# Patient Record
Sex: FEMALE | Race: WHITE | NOT HISPANIC OR LATINO | URBAN - METROPOLITAN AREA
[De-identification: names, ages, dates, MRNs, and addresses within clinical notes are randomized per-mention and may not be internally consistent; named-entity substitution may affect disease eponyms.]

---

## 2022-01-25 DIAGNOSIS — R42 DIZZY: Primary | ICD-10-CM

## 2022-01-27 LAB — EKG IMPRESSION: NORMAL

## 2023-01-01 ENCOUNTER — ANESTHESIA (OUTPATIENT)
Dept: SURGERY | Facility: MEDICAL CENTER | Age: 88
DRG: 469 | End: 2023-01-01
Payer: MEDICARE

## 2023-01-01 ENCOUNTER — APPOINTMENT (OUTPATIENT)
Dept: RADIOLOGY | Facility: MEDICAL CENTER | Age: 88
DRG: 480 | End: 2023-01-01
Attending: STUDENT IN AN ORGANIZED HEALTH CARE EDUCATION/TRAINING PROGRAM
Payer: MEDICARE

## 2023-01-01 ENCOUNTER — HOSPITAL ENCOUNTER (OUTPATIENT)
Dept: RADIOLOGY | Facility: MEDICAL CENTER | Age: 88
End: 2023-10-21

## 2023-01-01 ENCOUNTER — APPOINTMENT (OUTPATIENT)
Dept: RADIOLOGY | Facility: MEDICAL CENTER | Age: 88
DRG: 469 | End: 2023-01-01
Attending: STUDENT IN AN ORGANIZED HEALTH CARE EDUCATION/TRAINING PROGRAM
Payer: MEDICARE

## 2023-01-01 ENCOUNTER — ANESTHESIA EVENT (OUTPATIENT)
Dept: SURGERY | Facility: MEDICAL CENTER | Age: 88
DRG: 469 | End: 2023-01-01
Payer: MEDICARE

## 2023-01-01 ENCOUNTER — ANESTHESIA EVENT (OUTPATIENT)
Dept: SURGERY | Facility: MEDICAL CENTER | Age: 88
DRG: 480 | End: 2023-01-01
Payer: MEDICARE

## 2023-01-01 ENCOUNTER — APPOINTMENT (OUTPATIENT)
Dept: RADIOLOGY | Facility: MEDICAL CENTER | Age: 88
DRG: 469 | End: 2023-01-01
Payer: MEDICARE

## 2023-01-01 ENCOUNTER — APPOINTMENT (OUTPATIENT)
Dept: RADIOLOGY | Facility: MEDICAL CENTER | Age: 88
DRG: 480 | End: 2023-01-01
Attending: HOSPITALIST
Payer: MEDICARE

## 2023-01-01 ENCOUNTER — HOSPITAL ENCOUNTER (INPATIENT)
Facility: MEDICAL CENTER | Age: 88
LOS: 3 days | DRG: 480 | End: 2023-10-24
Attending: EMERGENCY MEDICINE | Admitting: STUDENT IN AN ORGANIZED HEALTH CARE EDUCATION/TRAINING PROGRAM
Payer: MEDICARE

## 2023-01-01 ENCOUNTER — APPOINTMENT (OUTPATIENT)
Dept: CARDIOLOGY | Facility: MEDICAL CENTER | Age: 88
DRG: 480 | End: 2023-01-01
Attending: STUDENT IN AN ORGANIZED HEALTH CARE EDUCATION/TRAINING PROGRAM
Payer: MEDICARE

## 2023-01-01 ENCOUNTER — APPOINTMENT (OUTPATIENT)
Dept: RADIOLOGY | Facility: MEDICAL CENTER | Age: 88
DRG: 480 | End: 2023-01-01
Attending: EMERGENCY MEDICINE
Payer: MEDICARE

## 2023-01-01 ENCOUNTER — HOSPITAL ENCOUNTER (INPATIENT)
Facility: MEDICAL CENTER | Age: 88
LOS: 3 days | DRG: 469 | End: 2023-11-09
Attending: STUDENT IN AN ORGANIZED HEALTH CARE EDUCATION/TRAINING PROGRAM | Admitting: STUDENT IN AN ORGANIZED HEALTH CARE EDUCATION/TRAINING PROGRAM
Payer: MEDICARE

## 2023-01-01 ENCOUNTER — ANESTHESIA (OUTPATIENT)
Dept: SURGERY | Facility: MEDICAL CENTER | Age: 88
DRG: 480 | End: 2023-01-01
Payer: MEDICARE

## 2023-01-01 VITALS
BODY MASS INDEX: 25.72 KG/M2 | DIASTOLIC BLOOD PRESSURE: 46 MMHG | HEART RATE: 103 BPM | TEMPERATURE: 98.4 F | WEIGHT: 139.77 LBS | RESPIRATION RATE: 18 BRPM | OXYGEN SATURATION: 95 % | SYSTOLIC BLOOD PRESSURE: 98 MMHG | HEIGHT: 62 IN

## 2023-01-01 VITALS
HEIGHT: 61 IN | WEIGHT: 146.61 LBS | HEART RATE: 89 BPM | RESPIRATION RATE: 15 BRPM | DIASTOLIC BLOOD PRESSURE: 53 MMHG | SYSTOLIC BLOOD PRESSURE: 115 MMHG | BODY MASS INDEX: 27.68 KG/M2 | TEMPERATURE: 98.6 F | OXYGEN SATURATION: 95 %

## 2023-01-01 DIAGNOSIS — M25.551 RIGHT HIP PAIN: ICD-10-CM

## 2023-01-01 DIAGNOSIS — S22.41XA CLOSED FRACTURE OF MULTIPLE RIBS OF RIGHT SIDE, INITIAL ENCOUNTER: ICD-10-CM

## 2023-01-01 DIAGNOSIS — S72.001A CLOSED FRACTURE OF RIGHT HIP, INITIAL ENCOUNTER (HCC): ICD-10-CM

## 2023-01-01 DIAGNOSIS — D64.9 ANEMIA, UNSPECIFIED TYPE: ICD-10-CM

## 2023-01-01 DIAGNOSIS — S72.144A CLOSED NONDISPLACED INTERTROCHANTERIC FRACTURE OF RIGHT FEMUR, INITIAL ENCOUNTER (HCC): ICD-10-CM

## 2023-01-01 LAB
ABO + RH BLD: NORMAL
ABO GROUP BLD: NORMAL
ABO GROUP BLD: NORMAL
ALBUMIN SERPL BCP-MCNC: 1.9 G/DL (ref 3.2–4.9)
ALBUMIN SERPL BCP-MCNC: 2.5 G/DL (ref 3.2–4.9)
ALBUMIN SERPL BCP-MCNC: 2.7 G/DL (ref 3.2–4.9)
ALBUMIN SERPL BCP-MCNC: 3.2 G/DL (ref 3.2–4.9)
ALBUMIN/GLOB SERPL: 0.9 G/DL
ALBUMIN/GLOB SERPL: 1.2 G/DL
ALP SERPL-CCNC: 100 U/L (ref 30–99)
ALP SERPL-CCNC: 162 U/L (ref 30–99)
ALT SERPL-CCNC: 12 U/L (ref 2–50)
ALT SERPL-CCNC: 19 U/L (ref 2–50)
ANION GAP SERPL CALC-SCNC: 10 MMOL/L (ref 7–16)
ANION GAP SERPL CALC-SCNC: 11 MMOL/L (ref 7–16)
ANION GAP SERPL CALC-SCNC: 13 MMOL/L (ref 7–16)
ANION GAP SERPL CALC-SCNC: 8 MMOL/L (ref 7–16)
ANION GAP SERPL CALC-SCNC: 9 MMOL/L (ref 7–16)
ANISOCYTOSIS BLD QL SMEAR: ABNORMAL
ANISOCYTOSIS BLD QL SMEAR: ABNORMAL
APPEARANCE UR: CLEAR
APTT PPP: 24.2 SEC (ref 24.7–36)
AST SERPL-CCNC: 24 U/L (ref 12–45)
AST SERPL-CCNC: 38 U/L (ref 12–45)
BACTERIA #/AREA URNS HPF: NEGATIVE /HPF
BACTERIA BLD CULT: NORMAL
BACTERIA BLD CULT: NORMAL
BACTERIA UR CULT: NORMAL
BARCODED ABORH UBTYP: 5100
BARCODED ABORH UBTYP: 5100
BARCODED ABORH UBTYP: 9500
BARCODED ABORH UBTYP: 9500
BARCODED PRD CODE UBPRD: NORMAL
BARCODED UNIT NUM UBUNT: NORMAL
BASOPHILS # BLD AUTO: 0 % (ref 0–1.8)
BASOPHILS # BLD AUTO: 0.1 % (ref 0–1.8)
BASOPHILS # BLD AUTO: 0.2 % (ref 0–1.8)
BASOPHILS # BLD AUTO: 0.2 % (ref 0–1.8)
BASOPHILS # BLD: 0 K/UL (ref 0–0.12)
BASOPHILS # BLD: 0.01 K/UL (ref 0–0.12)
BASOPHILS # BLD: 0.02 K/UL (ref 0–0.12)
BASOPHILS # BLD: 0.03 K/UL (ref 0–0.12)
BILIRUB SERPL-MCNC: 0.5 MG/DL (ref 0.1–1.5)
BILIRUB SERPL-MCNC: 0.8 MG/DL (ref 0.1–1.5)
BILIRUB UR QL STRIP.AUTO: NEGATIVE
BLD GP AB SCN SERPL QL: NORMAL
BLD GP AB SCN SERPL QL: NORMAL
BUN SERPL-MCNC: 14 MG/DL (ref 8–22)
BUN SERPL-MCNC: 19 MG/DL (ref 8–22)
BUN SERPL-MCNC: 22 MG/DL (ref 8–22)
BUN SERPL-MCNC: 26 MG/DL (ref 8–22)
BUN SERPL-MCNC: 31 MG/DL (ref 8–22)
BUN SERPL-MCNC: 31 MG/DL (ref 8–22)
BUN SERPL-MCNC: 35 MG/DL (ref 8–22)
CALCIUM ALBUM COR SERPL-MCNC: 8.5 MG/DL (ref 8.5–10.5)
CALCIUM ALBUM COR SERPL-MCNC: 8.8 MG/DL (ref 8.5–10.5)
CALCIUM ALBUM COR SERPL-MCNC: 8.8 MG/DL (ref 8.5–10.5)
CALCIUM ALBUM COR SERPL-MCNC: 8.9 MG/DL (ref 8.5–10.5)
CALCIUM SERPL-MCNC: 7.2 MG/DL (ref 8.5–10.5)
CALCIUM SERPL-MCNC: 7.3 MG/DL (ref 8.5–10.5)
CALCIUM SERPL-MCNC: 7.3 MG/DL (ref 8.5–10.5)
CALCIUM SERPL-MCNC: 7.5 MG/DL (ref 8.5–10.5)
CALCIUM SERPL-MCNC: 7.6 MG/DL (ref 8.5–10.5)
CALCIUM SERPL-MCNC: 7.8 MG/DL (ref 8.5–10.5)
CALCIUM SERPL-MCNC: 8.2 MG/DL (ref 8.5–10.5)
CFT BLD TEG: 4.5 MIN (ref 4.6–9.1)
CFT P HPASE BLD TEG: 4.5 MIN (ref 4.3–8.3)
CHLORIDE SERPL-SCNC: 100 MMOL/L (ref 96–112)
CHLORIDE SERPL-SCNC: 100 MMOL/L (ref 96–112)
CHLORIDE SERPL-SCNC: 103 MMOL/L (ref 96–112)
CHLORIDE SERPL-SCNC: 104 MMOL/L (ref 96–112)
CHLORIDE SERPL-SCNC: 107 MMOL/L (ref 96–112)
CLOT ANGLE BLD TEG: 76.1 DEGREES (ref 63–78)
CLOT LYSIS 30M P MA LENFR BLD TEG: 1.1 % (ref 0–2.6)
CO2 SERPL-SCNC: 20 MMOL/L (ref 20–33)
CO2 SERPL-SCNC: 21 MMOL/L (ref 20–33)
CO2 SERPL-SCNC: 21 MMOL/L (ref 20–33)
CO2 SERPL-SCNC: 23 MMOL/L (ref 20–33)
CO2 SERPL-SCNC: 24 MMOL/L (ref 20–33)
CO2 SERPL-SCNC: 24 MMOL/L (ref 20–33)
CO2 SERPL-SCNC: 26 MMOL/L (ref 20–33)
COLOR UR: ABNORMAL
COMMENT 1642: NORMAL
COMPONENT R 8504R: NORMAL
CREAT SERPL-MCNC: 1.23 MG/DL (ref 0.5–1.4)
CREAT SERPL-MCNC: 1.55 MG/DL (ref 0.5–1.4)
CREAT SERPL-MCNC: 1.56 MG/DL (ref 0.5–1.4)
CREAT SERPL-MCNC: 1.57 MG/DL (ref 0.5–1.4)
CREAT SERPL-MCNC: 1.65 MG/DL (ref 0.5–1.4)
CREAT SERPL-MCNC: 1.83 MG/DL (ref 0.5–1.4)
CREAT SERPL-MCNC: 1.84 MG/DL (ref 0.5–1.4)
CT.EXTRINSIC BLD ROTEM: 0.9 MIN (ref 0.8–2.1)
EKG IMPRESSION: NORMAL
EOSINOPHIL # BLD AUTO: 0 K/UL (ref 0–0.51)
EOSINOPHIL # BLD AUTO: 0 K/UL (ref 0–0.51)
EOSINOPHIL # BLD AUTO: 0.07 K/UL (ref 0–0.51)
EOSINOPHIL # BLD AUTO: 0.25 K/UL (ref 0–0.51)
EOSINOPHIL NFR BLD: 0 % (ref 0–6.9)
EOSINOPHIL NFR BLD: 0 % (ref 0–6.9)
EOSINOPHIL NFR BLD: 0.6 % (ref 0–6.9)
EOSINOPHIL NFR BLD: 3.1 % (ref 0–6.9)
EPI CELLS #/AREA URNS HPF: ABNORMAL /HPF
ERYTHROCYTE [DISTWIDTH] IN BLOOD BY AUTOMATED COUNT: 52.7 FL (ref 35.9–50)
ERYTHROCYTE [DISTWIDTH] IN BLOOD BY AUTOMATED COUNT: 53 FL (ref 35.9–50)
ERYTHROCYTE [DISTWIDTH] IN BLOOD BY AUTOMATED COUNT: 54.8 FL (ref 35.9–50)
ERYTHROCYTE [DISTWIDTH] IN BLOOD BY AUTOMATED COUNT: 57.7 FL (ref 35.9–50)
ERYTHROCYTE [DISTWIDTH] IN BLOOD BY AUTOMATED COUNT: 63.7 FL (ref 35.9–50)
ERYTHROCYTE [DISTWIDTH] IN BLOOD BY AUTOMATED COUNT: 73.4 FL (ref 35.9–50)
FERRITIN SERPL-MCNC: 362 NG/ML (ref 10–291)
GFR SERPLBLD CREATININE-BSD FMLA CKD-EPI: 25 ML/MIN/1.73 M 2
GFR SERPLBLD CREATININE-BSD FMLA CKD-EPI: 26 ML/MIN/1.73 M 2
GFR SERPLBLD CREATININE-BSD FMLA CKD-EPI: 29 ML/MIN/1.73 M 2
GFR SERPLBLD CREATININE-BSD FMLA CKD-EPI: 31 ML/MIN/1.73 M 2
GFR SERPLBLD CREATININE-BSD FMLA CKD-EPI: 41 ML/MIN/1.73 M 2
GLOBULIN SER CALC-MCNC: 2.7 G/DL (ref 1.9–3.5)
GLOBULIN SER CALC-MCNC: 2.9 G/DL (ref 1.9–3.5)
GLUCOSE SERPL-MCNC: 112 MG/DL (ref 65–99)
GLUCOSE SERPL-MCNC: 119 MG/DL (ref 65–99)
GLUCOSE SERPL-MCNC: 123 MG/DL (ref 65–99)
GLUCOSE SERPL-MCNC: 143 MG/DL (ref 65–99)
GLUCOSE SERPL-MCNC: 94 MG/DL (ref 65–99)
GLUCOSE SERPL-MCNC: 96 MG/DL (ref 65–99)
GLUCOSE SERPL-MCNC: 98 MG/DL (ref 65–99)
GLUCOSE UR STRIP.AUTO-MCNC: NEGATIVE MG/DL
HCT VFR BLD AUTO: 20.1 % (ref 37–47)
HCT VFR BLD AUTO: 20.8 % (ref 37–47)
HCT VFR BLD AUTO: 22.4 % (ref 37–47)
HCT VFR BLD AUTO: 22.4 % (ref 37–47)
HCT VFR BLD AUTO: 22.6 % (ref 37–47)
HCT VFR BLD AUTO: 24.6 % (ref 37–47)
HCT VFR BLD AUTO: 24.7 % (ref 37–47)
HCT VFR BLD AUTO: 24.9 % (ref 37–47)
HCT VFR BLD AUTO: 24.9 % (ref 37–47)
HCT VFR BLD AUTO: 25.1 % (ref 37–47)
HCT VFR BLD AUTO: 25.7 % (ref 37–47)
HCT VFR BLD AUTO: 27.3 % (ref 37–47)
HCT VFR BLD AUTO: 28.4 % (ref 37–47)
HCT VFR BLD AUTO: 32.4 % (ref 37–47)
HGB BLD-MCNC: 10.5 G/DL (ref 12–16)
HGB BLD-MCNC: 6.5 G/DL (ref 12–16)
HGB BLD-MCNC: 6.8 G/DL (ref 12–16)
HGB BLD-MCNC: 7.1 G/DL (ref 12–16)
HGB BLD-MCNC: 7.5 G/DL (ref 12–16)
HGB BLD-MCNC: 7.5 G/DL (ref 12–16)
HGB BLD-MCNC: 7.6 G/DL (ref 12–16)
HGB BLD-MCNC: 7.8 G/DL (ref 12–16)
HGB BLD-MCNC: 8 G/DL (ref 12–16)
HGB BLD-MCNC: 8 G/DL (ref 12–16)
HGB BLD-MCNC: 8.1 G/DL (ref 12–16)
HGB BLD-MCNC: 8.2 G/DL (ref 12–16)
HGB BLD-MCNC: 8.6 G/DL (ref 12–16)
HGB BLD-MCNC: 9.1 G/DL (ref 12–16)
HGB BLD-MCNC: 9.1 G/DL (ref 12–16)
HYALINE CASTS #/AREA URNS LPF: ABNORMAL /LPF
IMM GRANULOCYTES # BLD AUTO: 0.05 K/UL (ref 0–0.11)
IMM GRANULOCYTES # BLD AUTO: 0.12 K/UL (ref 0–0.11)
IMM GRANULOCYTES # BLD AUTO: 0.2 K/UL (ref 0–0.11)
IMM GRANULOCYTES NFR BLD AUTO: 0.6 % (ref 0–0.9)
IMM GRANULOCYTES NFR BLD AUTO: 0.7 % (ref 0–0.9)
IMM GRANULOCYTES NFR BLD AUTO: 1.7 % (ref 0–0.9)
INR PPP: 1.12 (ref 0.87–1.13)
INR PPP: 1.15 (ref 0.87–1.13)
IRON SATN MFR SERPL: 12 % (ref 15–55)
IRON SERPL-MCNC: 26 UG/DL (ref 40–170)
KETONES UR STRIP.AUTO-MCNC: ABNORMAL MG/DL
LACTATE SERPL-SCNC: 2.9 MMOL/L (ref 0.5–2)
LACTATE SERPL-SCNC: 3.1 MMOL/L (ref 0.5–2)
LACTATE SERPL-SCNC: 4.4 MMOL/L (ref 0.5–2)
LEUKOCYTE ESTERASE UR QL STRIP.AUTO: NEGATIVE
LV EJECT FRACT  99904: 55
LV EJECT FRACT MOD 2C 99903: 40.46
LV EJECT FRACT MOD 4C 99902: 48.7
LV EJECT FRACT MOD BP 99901: 47.5
LYMPHOCYTES # BLD AUTO: 0.54 K/UL (ref 1–4.8)
LYMPHOCYTES # BLD AUTO: 0.71 K/UL (ref 1–4.8)
LYMPHOCYTES # BLD AUTO: 0.75 K/UL (ref 1–4.8)
LYMPHOCYTES # BLD AUTO: 0.95 K/UL (ref 1–4.8)
LYMPHOCYTES NFR BLD: 3.3 % (ref 22–41)
LYMPHOCYTES NFR BLD: 6 % (ref 22–41)
LYMPHOCYTES NFR BLD: 8.1 % (ref 22–41)
LYMPHOCYTES NFR BLD: 8.8 % (ref 22–41)
MACROCYTES BLD QL SMEAR: ABNORMAL
MAGNESIUM SERPL-MCNC: 1.8 MG/DL (ref 1.5–2.5)
MAGNESIUM SERPL-MCNC: 2.1 MG/DL (ref 1.5–2.5)
MANUAL DIFF BLD: NORMAL
MCF BLD TEG: 67.4 MM (ref 52–69)
MCF.PLATELET INHIB BLD ROTEM: 27.9 MM (ref 15–32)
MCH RBC QN AUTO: 30.3 PG (ref 27–33)
MCH RBC QN AUTO: 30.6 PG (ref 27–33)
MCH RBC QN AUTO: 31.6 PG (ref 27–33)
MCH RBC QN AUTO: 31.6 PG (ref 27–33)
MCH RBC QN AUTO: 31.7 PG (ref 27–33)
MCH RBC QN AUTO: 33.5 PG (ref 27–33)
MCHC RBC AUTO-ENTMCNC: 31.1 G/DL (ref 32.2–35.5)
MCHC RBC AUTO-ENTMCNC: 31.5 G/DL (ref 32.2–35.5)
MCHC RBC AUTO-ENTMCNC: 32.1 G/DL (ref 32.2–35.5)
MCHC RBC AUTO-ENTMCNC: 32.3 G/DL (ref 32.2–35.5)
MCHC RBC AUTO-ENTMCNC: 32.4 G/DL (ref 32.2–35.5)
MCHC RBC AUTO-ENTMCNC: 33.5 G/DL (ref 32.2–35.5)
MCV RBC AUTO: 107.7 FL (ref 81.4–97.8)
MCV RBC AUTO: 94.3 FL (ref 81.4–97.8)
MCV RBC AUTO: 94.5 FL (ref 81.4–97.8)
MCV RBC AUTO: 97.2 FL (ref 81.4–97.8)
MCV RBC AUTO: 97.6 FL (ref 81.4–97.8)
MCV RBC AUTO: 98 FL (ref 81.4–97.8)
MICRO URNS: ABNORMAL
MICROCYTES BLD QL SMEAR: ABNORMAL
MONOCYTES # BLD AUTO: 0.54 K/UL (ref 0–0.85)
MONOCYTES # BLD AUTO: 0.65 K/UL (ref 0–0.85)
MONOCYTES # BLD AUTO: 0.67 K/UL (ref 0–0.85)
MONOCYTES # BLD AUTO: 0.95 K/UL (ref 0–0.85)
MONOCYTES NFR BLD AUTO: 4.3 % (ref 0–13.4)
MONOCYTES NFR BLD AUTO: 5.7 % (ref 0–13.4)
MONOCYTES NFR BLD AUTO: 5.9 % (ref 0–13.4)
MONOCYTES NFR BLD AUTO: 8.1 % (ref 0–13.4)
MORPHOLOGY BLD-IMP: NORMAL
MORPHOLOGY BLD-IMP: NORMAL
NEUTROPHILS # BLD AUTO: 11.21 K/UL (ref 1.82–7.42)
NEUTROPHILS # BLD AUTO: 14.54 K/UL (ref 1.82–7.42)
NEUTROPHILS # BLD AUTO: 6.37 K/UL (ref 1.82–7.42)
NEUTROPHILS # BLD AUTO: 9.9 K/UL (ref 1.82–7.42)
NEUTROPHILS NFR BLD: 79.2 % (ref 44–72)
NEUTROPHILS NFR BLD: 83.8 % (ref 44–72)
NEUTROPHILS NFR BLD: 89.7 % (ref 44–72)
NEUTROPHILS NFR BLD: 89.9 % (ref 44–72)
NITRITE UR QL STRIP.AUTO: NEGATIVE
NRBC # BLD AUTO: 0 K/UL
NRBC # BLD AUTO: 0.02 K/UL
NRBC BLD-RTO: 0 /100 WBC (ref 0–0.2)
NRBC BLD-RTO: 0.2 /100 WBC (ref 0–0.2)
PA AA BLD-ACNC: 67.9 % (ref 0–11)
PA ADP BLD-ACNC: 35.8 % (ref 0–17)
PH UR STRIP.AUTO: 5.5 [PH] (ref 5–8)
PHOSPHATE SERPL-MCNC: 3.9 MG/DL (ref 2.5–4.5)
PHOSPHATE SERPL-MCNC: 4.2 MG/DL (ref 2.5–4.5)
PLATELET # BLD AUTO: 151 K/UL (ref 164–446)
PLATELET # BLD AUTO: 181 K/UL (ref 164–446)
PLATELET # BLD AUTO: 210 K/UL (ref 164–446)
PLATELET # BLD AUTO: 282 K/UL (ref 164–446)
PLATELET # BLD AUTO: 288 K/UL (ref 164–446)
PLATELET # BLD AUTO: 440 K/UL (ref 164–446)
PLATELET BLD QL SMEAR: NORMAL
PLATELET BLD QL SMEAR: NORMAL
PMV BLD AUTO: 10.2 FL (ref 9–12.9)
PMV BLD AUTO: 10.6 FL (ref 9–12.9)
PMV BLD AUTO: 9.2 FL (ref 9–12.9)
PMV BLD AUTO: 9.5 FL (ref 9–12.9)
PMV BLD AUTO: 9.8 FL (ref 9–12.9)
PMV BLD AUTO: 9.9 FL (ref 9–12.9)
POTASSIUM SERPL-SCNC: 4.5 MMOL/L (ref 3.6–5.5)
POTASSIUM SERPL-SCNC: 4.5 MMOL/L (ref 3.6–5.5)
POTASSIUM SERPL-SCNC: 4.8 MMOL/L (ref 3.6–5.5)
POTASSIUM SERPL-SCNC: 5 MMOL/L (ref 3.6–5.5)
POTASSIUM SERPL-SCNC: 5 MMOL/L (ref 3.6–5.5)
POTASSIUM SERPL-SCNC: 5.1 MMOL/L (ref 3.6–5.5)
POTASSIUM SERPL-SCNC: 5.1 MMOL/L (ref 3.6–5.5)
POTASSIUM UR-SCNC: >100 MMOL/L
PROCALCITONIN SERPL-MCNC: 0.33 NG/ML
PRODUCT TYPE UPROD: NORMAL
PROT SERPL-MCNC: 5.6 G/DL (ref 6–8.2)
PROT SERPL-MCNC: 5.9 G/DL (ref 6–8.2)
PROT UR QL STRIP: 30 MG/DL
PROT UR-MCNC: 61 MG/DL (ref 0–15)
PROTHROMBIN TIME: 14.5 SEC (ref 12–14.6)
PROTHROMBIN TIME: 14.8 SEC (ref 12–14.6)
RBC # BLD AUTO: 2.05 M/UL (ref 4.2–5.4)
RBC # BLD AUTO: 2.33 M/UL (ref 4.2–5.4)
RBC # BLD AUTO: 2.37 M/UL (ref 4.2–5.4)
RBC # BLD AUTO: 2.64 M/UL (ref 4.2–5.4)
RBC # BLD AUTO: 2.81 M/UL (ref 4.2–5.4)
RBC # BLD AUTO: 3.32 M/UL (ref 4.2–5.4)
RBC # URNS HPF: ABNORMAL /HPF
RBC BLD AUTO: PRESENT
RBC BLD AUTO: PRESENT
RBC UR QL AUTO: NEGATIVE
RH BLD: NORMAL
RH BLD: NORMAL
SIGNIFICANT IND 70042: NORMAL
SITE SITE: NORMAL
SODIUM SERPL-SCNC: 133 MMOL/L (ref 135–145)
SODIUM SERPL-SCNC: 134 MMOL/L (ref 135–145)
SODIUM SERPL-SCNC: 134 MMOL/L (ref 135–145)
SODIUM SERPL-SCNC: 136 MMOL/L (ref 135–145)
SODIUM SERPL-SCNC: 137 MMOL/L (ref 135–145)
SODIUM SERPL-SCNC: 138 MMOL/L (ref 135–145)
SODIUM SERPL-SCNC: 139 MMOL/L (ref 135–145)
SODIUM UR-SCNC: 47 MMOL/L
SOURCE SOURCE: NORMAL
SP GR UR STRIP.AUTO: 1.04
STOMATOCYTES BLD QL SMEAR: NORMAL
TEG ALGORITHM TGALG: ABNORMAL
TIBC SERPL-MCNC: 218 UG/DL (ref 250–450)
TROPONIN T SERPL-MCNC: 62 NG/L (ref 6–19)
TROPONIN T SERPL-MCNC: 64 NG/L (ref 6–19)
TROPONIN T SERPL-MCNC: 68 NG/L (ref 6–19)
UIBC SERPL-MCNC: 192 UG/DL (ref 110–370)
UNIT STATUS USTAT: NORMAL
UROBILINOGEN UR STRIP.AUTO-MCNC: 0.2 MG/DL
WBC # BLD AUTO: 11.8 K/UL (ref 4.8–10.8)
WBC # BLD AUTO: 11.8 K/UL (ref 4.8–10.8)
WBC # BLD AUTO: 12.5 K/UL (ref 4.8–10.8)
WBC # BLD AUTO: 16.2 K/UL (ref 4.8–10.8)
WBC # BLD AUTO: 8.1 K/UL (ref 4.8–10.8)
WBC # BLD AUTO: 9.5 K/UL (ref 4.8–10.8)
WBC #/AREA URNS HPF: ABNORMAL /HPF

## 2023-01-01 PROCEDURE — 93005 ELECTROCARDIOGRAM TRACING: CPT | Performed by: HOSPITALIST

## 2023-01-01 PROCEDURE — 86923 COMPATIBILITY TEST ELECTRIC: CPT

## 2023-01-01 PROCEDURE — 85027 COMPLETE CBC AUTOMATED: CPT

## 2023-01-01 PROCEDURE — C1776 JOINT DEVICE (IMPLANTABLE): HCPCS | Performed by: STUDENT IN AN ORGANIZED HEALTH CARE EDUCATION/TRAINING PROGRAM

## 2023-01-01 PROCEDURE — 770020 HCHG ROOM/CARE - TELE (206)

## 2023-01-01 PROCEDURE — 73551 X-RAY EXAM OF FEMUR 1: CPT | Mod: RT

## 2023-01-01 PROCEDURE — C1713 ANCHOR/SCREW BN/BN,TIS/BN: HCPCS | Performed by: STUDENT IN AN ORGANIZED HEALTH CARE EDUCATION/TRAINING PROGRAM

## 2023-01-01 PROCEDURE — 85610 PROTHROMBIN TIME: CPT

## 2023-01-01 PROCEDURE — 80069 RENAL FUNCTION PANEL: CPT

## 2023-01-01 PROCEDURE — A9270 NON-COVERED ITEM OR SERVICE: HCPCS | Performed by: STUDENT IN AN ORGANIZED HEALTH CARE EDUCATION/TRAINING PROGRAM

## 2023-01-01 PROCEDURE — 84156 ASSAY OF PROTEIN URINE: CPT

## 2023-01-01 PROCEDURE — 700105 HCHG RX REV CODE 258

## 2023-01-01 PROCEDURE — 700105 HCHG RX REV CODE 258: Performed by: STUDENT IN AN ORGANIZED HEALTH CARE EDUCATION/TRAINING PROGRAM

## 2023-01-01 PROCEDURE — A9270 NON-COVERED ITEM OR SERVICE: HCPCS | Performed by: INTERNAL MEDICINE

## 2023-01-01 PROCEDURE — 0QS606Z REPOSITION RIGHT UPPER FEMUR WITH INTRAMEDULLARY INTERNAL FIXATION DEVICE, OPEN APPROACH: ICD-10-PCS | Performed by: STUDENT IN AN ORGANIZED HEALTH CARE EDUCATION/TRAINING PROGRAM

## 2023-01-01 PROCEDURE — 99285 EMERGENCY DEPT VISIT HI MDM: CPT

## 2023-01-01 PROCEDURE — 160029 HCHG SURGERY MINUTES - 1ST 30 MINS LEVEL 4: Performed by: STUDENT IN AN ORGANIZED HEALTH CARE EDUCATION/TRAINING PROGRAM

## 2023-01-01 PROCEDURE — 71045 X-RAY EXAM CHEST 1 VIEW: CPT

## 2023-01-01 PROCEDURE — 700102 HCHG RX REV CODE 250 W/ 637 OVERRIDE(OP): Performed by: STUDENT IN AN ORGANIZED HEALTH CARE EDUCATION/TRAINING PROGRAM

## 2023-01-01 PROCEDURE — 99239 HOSP IP/OBS DSCHRG MGMT >30: CPT | Performed by: INTERNAL MEDICINE

## 2023-01-01 PROCEDURE — 84133 ASSAY OF URINE POTASSIUM: CPT

## 2023-01-01 PROCEDURE — 84145 PROCALCITONIN (PCT): CPT

## 2023-01-01 PROCEDURE — 51798 US URINE CAPACITY MEASURE: CPT

## 2023-01-01 PROCEDURE — 85018 HEMOGLOBIN: CPT | Mod: 91

## 2023-01-01 PROCEDURE — 160002 HCHG RECOVERY MINUTES (STAT): Performed by: STUDENT IN AN ORGANIZED HEALTH CARE EDUCATION/TRAINING PROGRAM

## 2023-01-01 PROCEDURE — 86850 RBC ANTIBODY SCREEN: CPT

## 2023-01-01 PROCEDURE — 85347 COAGULATION TIME ACTIVATED: CPT

## 2023-01-01 PROCEDURE — 86900 BLOOD TYPING SEROLOGIC ABO: CPT

## 2023-01-01 PROCEDURE — 74230 X-RAY XM SWLNG FUNCJ C+: CPT

## 2023-01-01 PROCEDURE — 99233 SBSQ HOSP IP/OBS HIGH 50: CPT | Performed by: INTERNAL MEDICINE

## 2023-01-01 PROCEDURE — 84484 ASSAY OF TROPONIN QUANT: CPT

## 2023-01-01 PROCEDURE — 80053 COMPREHEN METABOLIC PANEL: CPT

## 2023-01-01 PROCEDURE — 92526 ORAL FUNCTION THERAPY: CPT

## 2023-01-01 PROCEDURE — 160048 HCHG OR STATISTICAL LEVEL 1-5: Performed by: STUDENT IN AN ORGANIZED HEALTH CARE EDUCATION/TRAINING PROGRAM

## 2023-01-01 PROCEDURE — 700102 HCHG RX REV CODE 250 W/ 637 OVERRIDE(OP): Performed by: INTERNAL MEDICINE

## 2023-01-01 PROCEDURE — 93306 TTE W/DOPPLER COMPLETE: CPT | Mod: 26 | Performed by: INTERNAL MEDICINE

## 2023-01-01 PROCEDURE — 700101 HCHG RX REV CODE 250: Performed by: INTERNAL MEDICINE

## 2023-01-01 PROCEDURE — 85014 HEMATOCRIT: CPT

## 2023-01-01 PROCEDURE — 502000 HCHG MISC OR IMPLANTS RC 0278: Performed by: STUDENT IN AN ORGANIZED HEALTH CARE EDUCATION/TRAINING PROGRAM

## 2023-01-01 PROCEDURE — 72170 X-RAY EXAM OF PELVIS: CPT

## 2023-01-01 PROCEDURE — 160031 HCHG SURGERY MINUTES - 1ST 30 MINS LEVEL 5: Performed by: STUDENT IN AN ORGANIZED HEALTH CARE EDUCATION/TRAINING PROGRAM

## 2023-01-01 PROCEDURE — 700105 HCHG RX REV CODE 258: Performed by: ANESTHESIOLOGY

## 2023-01-01 PROCEDURE — 160042 HCHG SURGERY MINUTES - EA ADDL 1 MIN LEVEL 5: Performed by: STUDENT IN AN ORGANIZED HEALTH CARE EDUCATION/TRAINING PROGRAM

## 2023-01-01 PROCEDURE — 700101 HCHG RX REV CODE 250: Performed by: ANESTHESIOLOGY

## 2023-01-01 PROCEDURE — 84300 ASSAY OF URINE SODIUM: CPT

## 2023-01-01 PROCEDURE — 99223 1ST HOSP IP/OBS HIGH 75: CPT | Mod: AI | Performed by: STUDENT IN AN ORGANIZED HEALTH CARE EDUCATION/TRAINING PROGRAM

## 2023-01-01 PROCEDURE — 83735 ASSAY OF MAGNESIUM: CPT

## 2023-01-01 PROCEDURE — 85007 BL SMEAR W/DIFF WBC COUNT: CPT

## 2023-01-01 PROCEDURE — 27132 TOTAL HIP ARTHROPLASTY: CPT | Mod: 78,RT | Performed by: STUDENT IN AN ORGANIZED HEALTH CARE EDUCATION/TRAINING PROGRAM

## 2023-01-01 PROCEDURE — 85025 COMPLETE CBC W/AUTO DIFF WBC: CPT

## 2023-01-01 PROCEDURE — 73502 X-RAY EXAM HIP UNI 2-3 VIEWS: CPT | Mod: RT

## 2023-01-01 PROCEDURE — 700102 HCHG RX REV CODE 250 W/ 637 OVERRIDE(OP): Performed by: HOSPITALIST

## 2023-01-01 PROCEDURE — 27132 TOTAL HIP ARTHROPLASTY: CPT | Mod: 80ROC,78,RT | Performed by: STUDENT IN AN ORGANIZED HEALTH CARE EDUCATION/TRAINING PROGRAM

## 2023-01-01 PROCEDURE — 93005 ELECTROCARDIOGRAM TRACING: CPT | Performed by: STUDENT IN AN ORGANIZED HEALTH CARE EDUCATION/TRAINING PROGRAM

## 2023-01-01 PROCEDURE — 99223 1ST HOSP IP/OBS HIGH 75: CPT | Mod: 25,AI | Performed by: STUDENT IN AN ORGANIZED HEALTH CARE EDUCATION/TRAINING PROGRAM

## 2023-01-01 PROCEDURE — 36430 TRANSFUSION BLD/BLD COMPNT: CPT

## 2023-01-01 PROCEDURE — 700111 HCHG RX REV CODE 636 W/ 250 OVERRIDE (IP): Performed by: STUDENT IN AN ORGANIZED HEALTH CARE EDUCATION/TRAINING PROGRAM

## 2023-01-01 PROCEDURE — P9016 RBC LEUKOCYTES REDUCED: HCPCS

## 2023-01-01 PROCEDURE — 36415 COLL VENOUS BLD VENIPUNCTURE: CPT

## 2023-01-01 PROCEDURE — 160035 HCHG PACU - 1ST 60 MINS PHASE I: Performed by: STUDENT IN AN ORGANIZED HEALTH CARE EDUCATION/TRAINING PROGRAM

## 2023-01-01 PROCEDURE — 85384 FIBRINOGEN ACTIVITY: CPT

## 2023-01-01 PROCEDURE — A9270 NON-COVERED ITEM OR SERVICE: HCPCS | Performed by: HOSPITALIST

## 2023-01-01 PROCEDURE — 700111 HCHG RX REV CODE 636 W/ 250 OVERRIDE (IP): Mod: JZ | Performed by: STUDENT IN AN ORGANIZED HEALTH CARE EDUCATION/TRAINING PROGRAM

## 2023-01-01 PROCEDURE — 87086 URINE CULTURE/COLONY COUNT: CPT

## 2023-01-01 PROCEDURE — 700111 HCHG RX REV CODE 636 W/ 250 OVERRIDE (IP): Mod: JZ | Performed by: ANESTHESIOLOGY

## 2023-01-01 PROCEDURE — 93306 TTE W/DOPPLER COMPLETE: CPT

## 2023-01-01 PROCEDURE — 92610 EVALUATE SWALLOWING FUNCTION: CPT

## 2023-01-01 PROCEDURE — 99232 SBSQ HOSP IP/OBS MODERATE 35: CPT | Performed by: STUDENT IN AN ORGANIZED HEALTH CARE EDUCATION/TRAINING PROGRAM

## 2023-01-01 PROCEDURE — 99291 CRITICAL CARE FIRST HOUR: CPT

## 2023-01-01 PROCEDURE — 87040 BLOOD CULTURE FOR BACTERIA: CPT

## 2023-01-01 PROCEDURE — 0SR901Z REPLACEMENT OF RIGHT HIP JOINT WITH METAL SYNTHETIC SUBSTITUTE, OPEN APPROACH: ICD-10-PCS | Performed by: STUDENT IN AN ORGANIZED HEALTH CARE EDUCATION/TRAINING PROGRAM

## 2023-01-01 PROCEDURE — 83540 ASSAY OF IRON: CPT

## 2023-01-01 PROCEDURE — 700111 HCHG RX REV CODE 636 W/ 250 OVERRIDE (IP): Performed by: ANESTHESIOLOGY

## 2023-01-01 PROCEDURE — 93005 ELECTROCARDIOGRAM TRACING: CPT | Performed by: EMERGENCY MEDICINE

## 2023-01-01 PROCEDURE — 160009 HCHG ANES TIME/MIN: Performed by: STUDENT IN AN ORGANIZED HEALTH CARE EDUCATION/TRAINING PROGRAM

## 2023-01-01 PROCEDURE — 700117 HCHG RX CONTRAST REV CODE 255: Performed by: STUDENT IN AN ORGANIZED HEALTH CARE EDUCATION/TRAINING PROGRAM

## 2023-01-01 PROCEDURE — 30233N1 TRANSFUSION OF NONAUTOLOGOUS RED BLOOD CELLS INTO PERIPHERAL VEIN, PERCUTANEOUS APPROACH: ICD-10-PCS | Performed by: STUDENT IN AN ORGANIZED HEALTH CARE EDUCATION/TRAINING PROGRAM

## 2023-01-01 PROCEDURE — 97166 OT EVAL MOD COMPLEX 45 MIN: CPT

## 2023-01-01 PROCEDURE — 80048 BASIC METABOLIC PNL TOTAL CA: CPT

## 2023-01-01 PROCEDURE — 700101 HCHG RX REV CODE 250

## 2023-01-01 PROCEDURE — 97535 SELF CARE MNGMENT TRAINING: CPT

## 2023-01-01 PROCEDURE — 97162 PT EVAL MOD COMPLEX 30 MIN: CPT

## 2023-01-01 PROCEDURE — 76775 US EXAM ABDO BACK WALL LIM: CPT

## 2023-01-01 PROCEDURE — 700105 HCHG RX REV CODE 258: Performed by: EMERGENCY MEDICINE

## 2023-01-01 PROCEDURE — 96372 THER/PROPH/DIAG INJ SC/IM: CPT

## 2023-01-01 PROCEDURE — 770001 HCHG ROOM/CARE - MED/SURG/GYN PRIV*

## 2023-01-01 PROCEDURE — 0QP604Z REMOVAL OF INTERNAL FIXATION DEVICE FROM RIGHT UPPER FEMUR, OPEN APPROACH: ICD-10-PCS | Performed by: STUDENT IN AN ORGANIZED HEALTH CARE EDUCATION/TRAINING PROGRAM

## 2023-01-01 PROCEDURE — P9016 RBC LEUKOCYTES REDUCED: HCPCS | Mod: 91

## 2023-01-01 PROCEDURE — 85018 HEMOGLOBIN: CPT

## 2023-01-01 PROCEDURE — 83550 IRON BINDING TEST: CPT

## 2023-01-01 PROCEDURE — 27245 TREAT THIGH FRACTURE: CPT | Mod: RT | Performed by: STUDENT IN AN ORGANIZED HEALTH CARE EDUCATION/TRAINING PROGRAM

## 2023-01-01 PROCEDURE — 700105 HCHG RX REV CODE 258: Performed by: INTERNAL MEDICINE

## 2023-01-01 PROCEDURE — 85576 BLOOD PLATELET AGGREGATION: CPT | Mod: 91

## 2023-01-01 PROCEDURE — 30233N1 TRANSFUSION OF NONAUTOLOGOUS RED BLOOD CELLS INTO PERIPHERAL VEIN, PERCUTANEOUS APPROACH: ICD-10-PCS

## 2023-01-01 PROCEDURE — 81001 URINALYSIS AUTO W/SCOPE: CPT

## 2023-01-01 PROCEDURE — 92611 MOTION FLUOROSCOPY/SWALLOW: CPT

## 2023-01-01 PROCEDURE — 93010 ELECTROCARDIOGRAM REPORT: CPT | Performed by: INTERNAL MEDICINE

## 2023-01-01 PROCEDURE — 83605 ASSAY OF LACTIC ACID: CPT

## 2023-01-01 PROCEDURE — 99222 1ST HOSP IP/OBS MODERATE 55: CPT | Mod: 57 | Performed by: STUDENT IN AN ORGANIZED HEALTH CARE EDUCATION/TRAINING PROGRAM

## 2023-01-01 PROCEDURE — 97116 GAIT TRAINING THERAPY: CPT

## 2023-01-01 PROCEDURE — 82728 ASSAY OF FERRITIN: CPT

## 2023-01-01 PROCEDURE — 99497 ADVNCD CARE PLAN 30 MIN: CPT | Performed by: STUDENT IN AN ORGANIZED HEALTH CARE EDUCATION/TRAINING PROGRAM

## 2023-01-01 PROCEDURE — 85730 THROMBOPLASTIN TIME PARTIAL: CPT

## 2023-01-01 PROCEDURE — 93010 ELECTROCARDIOGRAM REPORT: CPT | Performed by: STUDENT IN AN ORGANIZED HEALTH CARE EDUCATION/TRAINING PROGRAM

## 2023-01-01 PROCEDURE — 86901 BLOOD TYPING SEROLOGIC RH(D): CPT

## 2023-01-01 PROCEDURE — 160041 HCHG SURGERY MINUTES - EA ADDL 1 MIN LEVEL 4: Performed by: STUDENT IN AN ORGANIZED HEALTH CARE EDUCATION/TRAINING PROGRAM

## 2023-01-01 PROCEDURE — 93005 ELECTROCARDIOGRAM TRACING: CPT

## 2023-01-01 PROCEDURE — 73521 X-RAY EXAM HIPS BI 2 VIEWS: CPT

## 2023-01-01 PROCEDURE — 99233 SBSQ HOSP IP/OBS HIGH 50: CPT | Performed by: HOSPITALIST

## 2023-01-01 DEVICE — SCREW LAG 10.5MM X 85MM (4TX2=8): Type: IMPLANTABLE DEVICE | Site: HIP | Status: FUNCTIONAL

## 2023-01-01 DEVICE — NAIL HIP 127.5 DEGREE 10MM X 180MM (4TX2=8): Type: IMPLANTABLE DEVICE | Site: HIP | Status: FUNCTIONAL

## 2023-01-01 DEVICE — SCREW CROSS LOCK 5MM X 32.5MM (4TX5=20): Type: IMPLANTABLE DEVICE | Site: HIP | Status: FUNCTIONAL

## 2023-01-01 DEVICE — HIP DALL MILES SET 2.0 V BEAD: Type: IMPLANTABLE DEVICE | Site: HIP | Status: FUNCTIONAL

## 2023-01-01 DEVICE — IMPLANTABLE DEVICE: Type: IMPLANTABLE DEVICE | Site: HIP | Status: FUNCTIONAL

## 2023-01-01 RX ORDER — ONDANSETRON 2 MG/ML
INJECTION INTRAMUSCULAR; INTRAVENOUS PRN
Status: DISCONTINUED | OUTPATIENT
Start: 2023-01-01 | End: 2023-01-01 | Stop reason: SURG

## 2023-01-01 RX ORDER — HYDROCODONE BITARTRATE AND ACETAMINOPHEN 5; 325 MG/1; MG/1
1 TABLET ORAL EVERY 6 HOURS PRN
Status: ON HOLD | COMMUNITY
End: 2023-01-01 | Stop reason: SDUPTHER

## 2023-01-01 RX ORDER — PHENYLEPHRINE HYDROCHLORIDE 10 MG/ML
INJECTION, SOLUTION INTRAMUSCULAR; INTRAVENOUS; SUBCUTANEOUS PRN
Status: DISCONTINUED | OUTPATIENT
Start: 2023-01-01 | End: 2023-01-01 | Stop reason: SURG

## 2023-01-01 RX ORDER — ONDANSETRON 2 MG/ML
4 INJECTION INTRAMUSCULAR; INTRAVENOUS EVERY 4 HOURS PRN
Status: DISCONTINUED | OUTPATIENT
Start: 2023-01-01 | End: 2023-01-01

## 2023-01-01 RX ORDER — ASPIRIN 325 MG
325 TABLET ORAL DAILY
Status: DISCONTINUED | OUTPATIENT
Start: 2023-01-01 | End: 2023-01-01

## 2023-01-01 RX ORDER — TOBRAMYCIN 1.2 G/30ML
INJECTION, POWDER, LYOPHILIZED, FOR SOLUTION INTRAVENOUS
Status: DISCONTINUED | OUTPATIENT
Start: 2023-01-01 | End: 2023-01-01 | Stop reason: HOSPADM

## 2023-01-01 RX ORDER — SCOPOLAMINE 1 MG/3D
1 PATCH, EXTENDED RELEASE TRANSDERMAL
Status: DISCONTINUED | OUTPATIENT
Start: 2023-01-01 | End: 2023-01-01 | Stop reason: HOSPADM

## 2023-01-01 RX ORDER — AMOXICILLIN 250 MG
1 CAPSULE ORAL NIGHTLY
Status: DISCONTINUED | OUTPATIENT
Start: 2023-01-01 | End: 2023-01-01 | Stop reason: HOSPADM

## 2023-01-01 RX ORDER — OXYCODONE HYDROCHLORIDE 5 MG/1
5 TABLET ORAL EVERY 4 HOURS PRN
Status: DISCONTINUED | OUTPATIENT
Start: 2023-01-01 | End: 2023-01-01 | Stop reason: HOSPADM

## 2023-01-01 RX ORDER — HYDROCODONE BITARTRATE AND ACETAMINOPHEN 5; 325 MG/1; MG/1
1 TABLET ORAL EVERY 6 HOURS PRN
Qty: 12 TABLET | Refills: 0 | Status: SHIPPED | OUTPATIENT
Start: 2023-01-01 | End: 2023-01-01

## 2023-01-01 RX ORDER — ROCURONIUM BROMIDE 10 MG/ML
INJECTION, SOLUTION INTRAVENOUS PRN
Status: DISCONTINUED | OUTPATIENT
Start: 2023-01-01 | End: 2023-01-01 | Stop reason: SURG

## 2023-01-01 RX ORDER — FUROSEMIDE 20 MG/1
20 TABLET ORAL
Status: DISCONTINUED | OUTPATIENT
Start: 2023-01-01 | End: 2023-01-01 | Stop reason: HOSPADM

## 2023-01-01 RX ORDER — DONEPEZIL HYDROCHLORIDE 5 MG/1
5 TABLET, FILM COATED ORAL NIGHTLY
COMMUNITY

## 2023-01-01 RX ORDER — IPRATROPIUM BROMIDE AND ALBUTEROL SULFATE 2.5; .5 MG/3ML; MG/3ML
3 SOLUTION RESPIRATORY (INHALATION) EVERY 6 HOURS PRN
COMMUNITY

## 2023-01-01 RX ORDER — HALOPERIDOL 5 MG/ML
1 INJECTION INTRAMUSCULAR
Status: DISCONTINUED | OUTPATIENT
Start: 2023-01-01 | End: 2023-01-01 | Stop reason: HOSPADM

## 2023-01-01 RX ORDER — ALBUTEROL SULFATE 5 MG/ML
2.5 SOLUTION RESPIRATORY (INHALATION)
Status: DISCONTINUED | OUTPATIENT
Start: 2023-01-01 | End: 2023-01-01 | Stop reason: HOSPADM

## 2023-01-01 RX ORDER — ASPIRIN 81 MG/1
81 TABLET ORAL DAILY
Qty: 28 TABLET | Refills: 0 | Status: SHIPPED
Start: 2023-01-01 | End: 2023-01-01

## 2023-01-01 RX ORDER — OXYCODONE HYDROCHLORIDE 5 MG/1
2.5 TABLET ORAL
Status: DISCONTINUED | OUTPATIENT
Start: 2023-01-01 | End: 2023-01-01 | Stop reason: HOSPADM

## 2023-01-01 RX ORDER — DONEPEZIL HYDROCHLORIDE 5 MG/1
5 TABLET, FILM COATED ORAL EVERY EVENING
COMMUNITY
End: 2023-01-01

## 2023-01-01 RX ORDER — HEPARIN SODIUM 5000 [USP'U]/ML
5000 INJECTION, SOLUTION INTRAVENOUS; SUBCUTANEOUS EVERY 12 HOURS
Status: DISCONTINUED | OUTPATIENT
Start: 2023-01-01 | End: 2023-01-01 | Stop reason: HOSPADM

## 2023-01-01 RX ORDER — SODIUM CHLORIDE, SODIUM GLUCONATE, SODIUM ACETATE, POTASSIUM CHLORIDE AND MAGNESIUM CHLORIDE 526; 502; 368; 37; 30 MG/100ML; MG/100ML; MG/100ML; MG/100ML; MG/100ML
INJECTION, SOLUTION INTRAVENOUS
Status: DISCONTINUED | OUTPATIENT
Start: 2023-01-01 | End: 2023-01-01 | Stop reason: SURG

## 2023-01-01 RX ORDER — HALOPERIDOL 5 MG/ML
1 INJECTION INTRAMUSCULAR EVERY 6 HOURS PRN
Status: DISCONTINUED | OUTPATIENT
Start: 2023-01-01 | End: 2023-01-01 | Stop reason: HOSPADM

## 2023-01-01 RX ORDER — METOPROLOL TARTRATE 25 MG/1
12.5 TABLET, FILM COATED ORAL 2 TIMES DAILY
Status: DISCONTINUED | OUTPATIENT
Start: 2023-01-01 | End: 2023-01-01 | Stop reason: HOSPADM

## 2023-01-01 RX ORDER — ONDANSETRON 2 MG/ML
4 INJECTION INTRAMUSCULAR; INTRAVENOUS EVERY 4 HOURS PRN
Status: DISCONTINUED | OUTPATIENT
Start: 2023-01-01 | End: 2023-01-01 | Stop reason: HOSPADM

## 2023-01-01 RX ORDER — SODIUM CHLORIDE, SODIUM LACTATE, POTASSIUM CHLORIDE, AND CALCIUM CHLORIDE .6; .31; .03; .02 G/100ML; G/100ML; G/100ML; G/100ML
500 INJECTION, SOLUTION INTRAVENOUS ONCE
Status: COMPLETED | OUTPATIENT
Start: 2023-01-01 | End: 2023-01-01

## 2023-01-01 RX ORDER — SODIUM CHLORIDE, SODIUM LACTATE, POTASSIUM CHLORIDE, CALCIUM CHLORIDE 600; 310; 30; 20 MG/100ML; MG/100ML; MG/100ML; MG/100ML
1000 INJECTION, SOLUTION INTRAVENOUS ONCE
Status: COMPLETED | OUTPATIENT
Start: 2023-01-01 | End: 2023-01-01

## 2023-01-01 RX ORDER — DOCUSATE SODIUM 100 MG/1
100 CAPSULE, LIQUID FILLED ORAL 2 TIMES DAILY
Status: DISCONTINUED | OUTPATIENT
Start: 2023-01-01 | End: 2023-01-01 | Stop reason: HOSPADM

## 2023-01-01 RX ORDER — SODIUM CHLORIDE, SODIUM LACTATE, POTASSIUM CHLORIDE, CALCIUM CHLORIDE 600; 310; 30; 20 MG/100ML; MG/100ML; MG/100ML; MG/100ML
INJECTION, SOLUTION INTRAVENOUS
Status: DISCONTINUED | OUTPATIENT
Start: 2023-01-01 | End: 2023-01-01 | Stop reason: SURG

## 2023-01-01 RX ORDER — DIPHENHYDRAMINE HYDROCHLORIDE 50 MG/ML
12.5 INJECTION INTRAMUSCULAR; INTRAVENOUS
Status: DISCONTINUED | OUTPATIENT
Start: 2023-01-01 | End: 2023-01-01 | Stop reason: HOSPADM

## 2023-01-01 RX ORDER — DEXAMETHASONE SODIUM PHOSPHATE 4 MG/ML
4 INJECTION, SOLUTION INTRA-ARTICULAR; INTRALESIONAL; INTRAMUSCULAR; INTRAVENOUS; SOFT TISSUE
Status: DISCONTINUED | OUTPATIENT
Start: 2023-01-01 | End: 2023-01-01

## 2023-01-01 RX ORDER — LIDOCAINE 50 MG/G
1 PATCH TOPICAL EVERY 24 HOURS
Status: DISCONTINUED | OUTPATIENT
Start: 2023-01-01 | End: 2023-01-01 | Stop reason: HOSPADM

## 2023-01-01 RX ORDER — ASPIRIN 300 MG/1
300 SUPPOSITORY RECTAL DAILY
Status: DISCONTINUED | OUTPATIENT
Start: 2023-01-01 | End: 2023-01-01

## 2023-01-01 RX ORDER — DEXAMETHASONE SODIUM PHOSPHATE 4 MG/ML
INJECTION, SOLUTION INTRA-ARTICULAR; INTRALESIONAL; INTRAMUSCULAR; INTRAVENOUS; SOFT TISSUE PRN
Status: DISCONTINUED | OUTPATIENT
Start: 2023-01-01 | End: 2023-01-01 | Stop reason: SURG

## 2023-01-01 RX ORDER — ASPIRIN 81 MG/1
81 TABLET ORAL DAILY
Status: DISCONTINUED | OUTPATIENT
Start: 2023-01-01 | End: 2023-01-01 | Stop reason: HOSPADM

## 2023-01-01 RX ORDER — CITALOPRAM HYDROBROMIDE 10 MG/1
10 TABLET ORAL EVERY EVENING
COMMUNITY
End: 2023-01-01

## 2023-01-01 RX ORDER — HYDROMORPHONE HYDROCHLORIDE 1 MG/ML
0.2 INJECTION, SOLUTION INTRAMUSCULAR; INTRAVENOUS; SUBCUTANEOUS
Status: DISCONTINUED | OUTPATIENT
Start: 2023-01-01 | End: 2023-01-01 | Stop reason: HOSPADM

## 2023-01-01 RX ORDER — HEPARIN SODIUM 5000 [USP'U]/ML
5000 INJECTION, SOLUTION INTRAVENOUS; SUBCUTANEOUS EVERY 8 HOURS
Status: DISCONTINUED | OUTPATIENT
Start: 2023-01-01 | End: 2023-01-01

## 2023-01-01 RX ORDER — LEVOTHYROXINE SODIUM 88 UG/1
88 TABLET ORAL DAILY
Status: DISCONTINUED | OUTPATIENT
Start: 2023-01-01 | End: 2023-01-01 | Stop reason: HOSPADM

## 2023-01-01 RX ORDER — HYDROMORPHONE HYDROCHLORIDE 1 MG/ML
0.25 INJECTION, SOLUTION INTRAMUSCULAR; INTRAVENOUS; SUBCUTANEOUS
Status: DISCONTINUED | OUTPATIENT
Start: 2023-01-01 | End: 2023-01-01 | Stop reason: HOSPADM

## 2023-01-01 RX ORDER — POLYETHYLENE GLYCOL 3350 17 G/17G
1 POWDER, FOR SOLUTION ORAL
Status: DISCONTINUED | OUTPATIENT
Start: 2023-01-01 | End: 2023-01-01

## 2023-01-01 RX ORDER — POLYETHYLENE GLYCOL 3350 17 G/17G
1 POWDER, FOR SOLUTION ORAL 2 TIMES DAILY PRN
Status: DISCONTINUED | OUTPATIENT
Start: 2023-01-01 | End: 2023-01-01 | Stop reason: HOSPADM

## 2023-01-01 RX ORDER — METOPROLOL TARTRATE 1 MG/ML
5 INJECTION, SOLUTION INTRAVENOUS
Status: DISCONTINUED | OUTPATIENT
Start: 2023-01-01 | End: 2023-01-01 | Stop reason: HOSPADM

## 2023-01-01 RX ORDER — DONEPEZIL HYDROCHLORIDE 5 MG/1
5 TABLET, FILM COATED ORAL EVERY EVENING
Status: DISCONTINUED | OUTPATIENT
Start: 2023-01-01 | End: 2023-01-01 | Stop reason: HOSPADM

## 2023-01-01 RX ORDER — ACETAMINOPHEN 325 MG/1
650 TABLET ORAL EVERY 4 HOURS PRN
Qty: 30 TABLET | Refills: 0 | Status: SHIPPED
Start: 2023-01-01 | End: 2023-01-01

## 2023-01-01 RX ORDER — VANCOMYCIN HYDROCHLORIDE 1 G/20ML
INJECTION, POWDER, LYOPHILIZED, FOR SOLUTION INTRAVENOUS
Status: COMPLETED | OUTPATIENT
Start: 2023-01-01 | End: 2023-01-01

## 2023-01-01 RX ORDER — AMOXICILLIN 250 MG
2 CAPSULE ORAL 2 TIMES DAILY
Status: DISCONTINUED | OUTPATIENT
Start: 2023-01-01 | End: 2023-01-01 | Stop reason: HOSPADM

## 2023-01-01 RX ORDER — HYDRALAZINE HYDROCHLORIDE 20 MG/ML
5 INJECTION INTRAMUSCULAR; INTRAVENOUS
Status: DISCONTINUED | OUTPATIENT
Start: 2023-01-01 | End: 2023-01-01 | Stop reason: HOSPADM

## 2023-01-01 RX ORDER — SODIUM CHLORIDE, SODIUM LACTATE, POTASSIUM CHLORIDE, CALCIUM CHLORIDE 600; 310; 30; 20 MG/100ML; MG/100ML; MG/100ML; MG/100ML
INJECTION, SOLUTION INTRAVENOUS CONTINUOUS
Status: DISCONTINUED | OUTPATIENT
Start: 2023-01-01 | End: 2023-01-01 | Stop reason: HOSPADM

## 2023-01-01 RX ORDER — LIDOCAINE HYDROCHLORIDE 20 MG/ML
INJECTION, SOLUTION EPIDURAL; INFILTRATION; INTRACAUDAL; PERINEURAL PRN
Status: DISCONTINUED | OUTPATIENT
Start: 2023-01-01 | End: 2023-01-01 | Stop reason: SURG

## 2023-01-01 RX ORDER — ENOXAPARIN SODIUM 100 MG/ML
40 INJECTION SUBCUTANEOUS DAILY
Status: DISCONTINUED | OUTPATIENT
Start: 2023-01-01 | End: 2023-01-01

## 2023-01-01 RX ORDER — LABETALOL HYDROCHLORIDE 5 MG/ML
10 INJECTION, SOLUTION INTRAVENOUS EVERY 4 HOURS PRN
Status: DISCONTINUED | OUTPATIENT
Start: 2023-01-01 | End: 2023-01-01 | Stop reason: HOSPADM

## 2023-01-01 RX ORDER — DOCUSATE SODIUM 100 MG/1
100 CAPSULE, LIQUID FILLED ORAL
COMMUNITY

## 2023-01-01 RX ORDER — OXYCODONE HCL 5 MG/5 ML
10 SOLUTION, ORAL ORAL
Status: DISCONTINUED | OUTPATIENT
Start: 2023-01-01 | End: 2023-01-01 | Stop reason: HOSPADM

## 2023-01-01 RX ORDER — ASCORBIC ACID 500 MG
500 TABLET ORAL 2 TIMES DAILY
COMMUNITY

## 2023-01-01 RX ORDER — SENNOSIDES 8.6 MG
650 CAPSULE ORAL EVERY 6 HOURS PRN
COMMUNITY

## 2023-01-01 RX ORDER — CEFAZOLIN SODIUM 1 G/3ML
INJECTION, POWDER, FOR SOLUTION INTRAMUSCULAR; INTRAVENOUS PRN
Status: DISCONTINUED | OUTPATIENT
Start: 2023-01-01 | End: 2023-01-01 | Stop reason: SURG

## 2023-01-01 RX ORDER — AMOXICILLIN 250 MG
2 CAPSULE ORAL 2 TIMES DAILY
Status: DISCONTINUED | OUTPATIENT
Start: 2023-01-01 | End: 2023-01-01

## 2023-01-01 RX ORDER — FUROSEMIDE 20 MG/1
20 TABLET ORAL DAILY
Qty: 3 TABLET | Refills: 0 | Status: SHIPPED
Start: 2023-01-01 | End: 2023-01-01

## 2023-01-01 RX ORDER — LIDOCAINE 50 MG/G
1 PATCH TOPICAL EVERY 24 HOURS
Qty: 10 PATCH | Refills: 0 | Status: SHIPPED
Start: 2023-01-01 | End: 2023-01-01

## 2023-01-01 RX ORDER — GUAIFENESIN 600 MG/1
1200 TABLET, EXTENDED RELEASE ORAL EVERY 12 HOURS
COMMUNITY

## 2023-01-01 RX ORDER — ACETAMINOPHEN 325 MG/1
650 TABLET ORAL EVERY 4 HOURS PRN
Status: DISCONTINUED | OUTPATIENT
Start: 2023-01-01 | End: 2023-01-01 | Stop reason: HOSPADM

## 2023-01-01 RX ORDER — HYDROMORPHONE HYDROCHLORIDE 2 MG/ML
INJECTION, SOLUTION INTRAMUSCULAR; INTRAVENOUS; SUBCUTANEOUS PRN
Status: DISCONTINUED | OUTPATIENT
Start: 2023-01-01 | End: 2023-01-01 | Stop reason: SURG

## 2023-01-01 RX ORDER — HYDROMORPHONE HYDROCHLORIDE 1 MG/ML
0.1 INJECTION, SOLUTION INTRAMUSCULAR; INTRAVENOUS; SUBCUTANEOUS
Status: DISCONTINUED | OUTPATIENT
Start: 2023-01-01 | End: 2023-01-01 | Stop reason: HOSPADM

## 2023-01-01 RX ORDER — ASPIRIN 81 MG/1
324 TABLET, CHEWABLE ORAL DAILY
Status: DISCONTINUED | OUTPATIENT
Start: 2023-01-01 | End: 2023-01-01

## 2023-01-01 RX ORDER — ENOXAPARIN SODIUM 100 MG/ML
40 INJECTION SUBCUTANEOUS
Status: DISCONTINUED | OUTPATIENT
Start: 2023-01-01 | End: 2023-01-01

## 2023-01-01 RX ORDER — LEVOTHYROXINE SODIUM 88 UG/1
88 TABLET ORAL
COMMUNITY

## 2023-01-01 RX ORDER — BISACODYL 10 MG
10 SUPPOSITORY, RECTAL RECTAL
Status: DISCONTINUED | OUTPATIENT
Start: 2023-01-01 | End: 2023-01-01 | Stop reason: HOSPADM

## 2023-01-01 RX ORDER — METOPROLOL TARTRATE 1 MG/ML
2.5 INJECTION, SOLUTION INTRAVENOUS
Status: DISCONTINUED | OUTPATIENT
Start: 2023-01-01 | End: 2023-01-01

## 2023-01-01 RX ORDER — HYDROMORPHONE HYDROCHLORIDE 1 MG/ML
0.4 INJECTION, SOLUTION INTRAMUSCULAR; INTRAVENOUS; SUBCUTANEOUS
Status: DISCONTINUED | OUTPATIENT
Start: 2023-01-01 | End: 2023-01-01 | Stop reason: HOSPADM

## 2023-01-01 RX ORDER — ASPIRIN 81 MG/1
81 TABLET ORAL DAILY
COMMUNITY

## 2023-01-01 RX ORDER — POLYETHYLENE GLYCOL 3350 17 G/17G
1 POWDER, FOR SOLUTION ORAL
Status: DISCONTINUED | OUTPATIENT
Start: 2023-01-01 | End: 2023-01-01 | Stop reason: HOSPADM

## 2023-01-01 RX ORDER — LEVOTHYROXINE SODIUM 88 UG/1
88 TABLET ORAL EVERY EVENING
COMMUNITY
End: 2023-01-01

## 2023-01-01 RX ORDER — OXYCODONE HYDROCHLORIDE 5 MG/1
5 TABLET ORAL
Status: DISCONTINUED | OUTPATIENT
Start: 2023-01-01 | End: 2023-01-01 | Stop reason: HOSPADM

## 2023-01-01 RX ORDER — TRANEXAMIC ACID 100 MG/ML
INJECTION, SOLUTION INTRAVENOUS PRN
Status: DISCONTINUED | OUTPATIENT
Start: 2023-01-01 | End: 2023-01-01 | Stop reason: SURG

## 2023-01-01 RX ORDER — EPHEDRINE SULFATE 50 MG/ML
5 INJECTION, SOLUTION INTRAVENOUS
Status: COMPLETED | OUTPATIENT
Start: 2023-01-01 | End: 2023-01-01

## 2023-01-01 RX ORDER — LIDOCAINE 50 MG/G
1 PATCH TOPICAL EVERY 24 HOURS
COMMUNITY

## 2023-01-01 RX ORDER — ONDANSETRON 4 MG/1
4 TABLET, ORALLY DISINTEGRATING ORAL EVERY 4 HOURS PRN
Status: DISCONTINUED | OUTPATIENT
Start: 2023-01-01 | End: 2023-01-01 | Stop reason: HOSPADM

## 2023-01-01 RX ORDER — SODIUM CHLORIDE 9 MG/ML
INJECTION, SOLUTION INTRAVENOUS CONTINUOUS
Status: ACTIVE | OUTPATIENT
Start: 2023-01-01 | End: 2023-01-01

## 2023-01-01 RX ORDER — DIPHENHYDRAMINE HYDROCHLORIDE 50 MG/ML
25 INJECTION INTRAMUSCULAR; INTRAVENOUS EVERY 6 HOURS PRN
Status: DISCONTINUED | OUTPATIENT
Start: 2023-01-01 | End: 2023-01-01

## 2023-01-01 RX ORDER — SODIUM CHLORIDE 9 MG/ML
INJECTION, SOLUTION INTRAVENOUS CONTINUOUS
Status: DISCONTINUED | OUTPATIENT
Start: 2023-01-01 | End: 2023-01-01

## 2023-01-01 RX ORDER — LEVOTHYROXINE SODIUM 88 UG/1
88 TABLET ORAL
Status: DISCONTINUED | OUTPATIENT
Start: 2023-01-01 | End: 2023-01-01 | Stop reason: HOSPADM

## 2023-01-01 RX ORDER — OXYCODONE HCL 5 MG/5 ML
5 SOLUTION, ORAL ORAL
Status: DISCONTINUED | OUTPATIENT
Start: 2023-01-01 | End: 2023-01-01 | Stop reason: HOSPADM

## 2023-01-01 RX ORDER — AMOXICILLIN 250 MG
1 CAPSULE ORAL
Status: DISCONTINUED | OUTPATIENT
Start: 2023-01-01 | End: 2023-01-01 | Stop reason: HOSPADM

## 2023-01-01 RX ORDER — OXYCODONE HYDROCHLORIDE 5 MG/1
2.5 TABLET ORAL EVERY 4 HOURS PRN
Status: DISCONTINUED | OUTPATIENT
Start: 2023-01-01 | End: 2023-01-01 | Stop reason: HOSPADM

## 2023-01-01 RX ORDER — CITALOPRAM HYDROBROMIDE 10 MG/1
10 TABLET ORAL
COMMUNITY

## 2023-01-01 RX ORDER — BISACODYL 10 MG
10 SUPPOSITORY, RECTAL RECTAL
Status: DISCONTINUED | OUTPATIENT
Start: 2023-01-01 | End: 2023-01-01

## 2023-01-01 RX ORDER — MEPERIDINE HYDROCHLORIDE 25 MG/ML
6.25 INJECTION INTRAMUSCULAR; INTRAVENOUS; SUBCUTANEOUS
Status: DISCONTINUED | OUTPATIENT
Start: 2023-01-01 | End: 2023-01-01 | Stop reason: HOSPADM

## 2023-01-01 RX ORDER — ONDANSETRON 2 MG/ML
4 INJECTION INTRAMUSCULAR; INTRAVENOUS
Status: DISCONTINUED | OUTPATIENT
Start: 2023-01-01 | End: 2023-01-01 | Stop reason: HOSPADM

## 2023-01-01 RX ORDER — SODIUM CHLORIDE, SODIUM LACTATE, POTASSIUM CHLORIDE, CALCIUM CHLORIDE 600; 310; 30; 20 MG/100ML; MG/100ML; MG/100ML; MG/100ML
INJECTION, SOLUTION INTRAVENOUS CONTINUOUS
Status: ACTIVE | OUTPATIENT
Start: 2023-01-01 | End: 2023-01-01

## 2023-01-01 RX ORDER — CITALOPRAM HYDROBROMIDE 20 MG/1
10 TABLET ORAL EVERY EVENING
Status: DISCONTINUED | OUTPATIENT
Start: 2023-01-01 | End: 2023-01-01 | Stop reason: HOSPADM

## 2023-01-01 RX ADMIN — SODIUM CHLORIDE: 9 INJECTION, SOLUTION INTRAVENOUS at 09:41

## 2023-01-01 RX ADMIN — DONEPEZIL HYDROCHLORIDE 5 MG: 5 TABLET, FILM COATED ORAL at 17:38

## 2023-01-01 RX ADMIN — ASPIRIN 325 MG: 325 TABLET ORAL at 05:06

## 2023-01-01 RX ADMIN — ASPIRIN 81 MG: 81 TABLET, COATED ORAL at 05:24

## 2023-01-01 RX ADMIN — CEFAZOLIN 2 G: 1 INJECTION, POWDER, FOR SOLUTION INTRAMUSCULAR; INTRAVENOUS at 12:35

## 2023-01-01 RX ADMIN — HEPARIN SODIUM 5000 UNITS: 5000 INJECTION, SOLUTION INTRAVENOUS; SUBCUTANEOUS at 05:18

## 2023-01-01 RX ADMIN — FENTANYL CITRATE 50 MCG: 50 INJECTION, SOLUTION INTRAMUSCULAR; INTRAVENOUS at 07:39

## 2023-01-01 RX ADMIN — LIDOCAINE 1 PATCH: 50 PATCH CUTANEOUS at 12:39

## 2023-01-01 RX ADMIN — LIDOCAINE HYDROCHLORIDE 50 MG: 20 INJECTION, SOLUTION EPIDURAL; INFILTRATION; INTRACAUDAL at 07:39

## 2023-01-01 RX ADMIN — SODIUM CHLORIDE, POTASSIUM CHLORIDE, SODIUM LACTATE AND CALCIUM CHLORIDE: 600; 310; 30; 20 INJECTION, SOLUTION INTRAVENOUS at 12:15

## 2023-01-01 RX ADMIN — HYDROMORPHONE HYDROCHLORIDE 0.25 MG: 2 INJECTION INTRAMUSCULAR; INTRAVENOUS; SUBCUTANEOUS at 14:10

## 2023-01-01 RX ADMIN — ASPIRIN 325 MG: 325 TABLET ORAL at 17:38

## 2023-01-01 RX ADMIN — DONEPEZIL HYDROCHLORIDE 5 MG: 5 TABLET, FILM COATED ORAL at 16:35

## 2023-01-01 RX ADMIN — METOPROLOL TARTRATE 5 MG: 5 INJECTION INTRAVENOUS at 05:05

## 2023-01-01 RX ADMIN — METOPROLOL TARTRATE 12.5 MG: 25 TABLET, FILM COATED ORAL at 17:39

## 2023-01-01 RX ADMIN — ACETAMINOPHEN 650 MG: 325 TABLET, FILM COATED ORAL at 10:44

## 2023-01-01 RX ADMIN — SODIUM CHLORIDE: 9 INJECTION, SOLUTION INTRAVENOUS at 17:45

## 2023-01-01 RX ADMIN — OXYCODONE 5 MG: 5 TABLET ORAL at 21:56

## 2023-01-01 RX ADMIN — SENNOSIDES AND DOCUSATE SODIUM 2 TABLET: 50; 8.6 TABLET ORAL at 17:38

## 2023-01-01 RX ADMIN — SODIUM CHLORIDE, SODIUM GLUCONATE, SODIUM ACETATE, POTASSIUM CHLORIDE AND MAGNESIUM CHLORIDE: 526; 502; 368; 37; 30 INJECTION, SOLUTION INTRAVENOUS at 12:42

## 2023-01-01 RX ADMIN — DOCUSATE SODIUM 50 MG AND SENNOSIDES 8.6 MG 1 TABLET: 8.6; 5 TABLET, FILM COATED ORAL at 21:07

## 2023-01-01 RX ADMIN — EPHEDRINE SULFATE 5 MG: 50 INJECTION INTRAVENOUS at 15:50

## 2023-01-01 RX ADMIN — CEFTRIAXONE SODIUM 2000 MG: 10 INJECTION, POWDER, FOR SOLUTION INTRAVENOUS at 05:07

## 2023-01-01 RX ADMIN — SODIUM CHLORIDE: 9 INJECTION, SOLUTION INTRAVENOUS at 05:06

## 2023-01-01 RX ADMIN — DOCUSATE SODIUM 100 MG: 100 CAPSULE, LIQUID FILLED ORAL at 04:35

## 2023-01-01 RX ADMIN — OXYCODONE 5 MG: 5 TABLET ORAL at 11:33

## 2023-01-01 RX ADMIN — SODIUM CHLORIDE: 9 INJECTION, SOLUTION INTRAVENOUS at 14:00

## 2023-01-01 RX ADMIN — PROPOFOL 50 MG: 10 INJECTION, EMULSION INTRAVENOUS at 07:39

## 2023-01-01 RX ADMIN — SODIUM CHLORIDE, POTASSIUM CHLORIDE, SODIUM LACTATE AND CALCIUM CHLORIDE 500 ML: 600; 310; 30; 20 INJECTION, SOLUTION INTRAVENOUS at 04:46

## 2023-01-01 RX ADMIN — DOCUSATE SODIUM 100 MG: 100 CAPSULE, LIQUID FILLED ORAL at 16:35

## 2023-01-01 RX ADMIN — CEFAZOLIN 2 G: 1 INJECTION, POWDER, FOR SOLUTION INTRAMUSCULAR; INTRAVENOUS at 07:35

## 2023-01-01 RX ADMIN — ASPIRIN 81 MG: 81 TABLET, COATED ORAL at 05:13

## 2023-01-01 RX ADMIN — SUGAMMADEX 100 MG: 100 INJECTION, SOLUTION INTRAVENOUS at 14:05

## 2023-01-01 RX ADMIN — FENTANYL CITRATE 50 MCG: 50 INJECTION, SOLUTION INTRAMUSCULAR; INTRAVENOUS at 07:49

## 2023-01-01 RX ADMIN — HYDROMORPHONE HYDROCHLORIDE 0.25 MG: 2 INJECTION INTRAMUSCULAR; INTRAVENOUS; SUBCUTANEOUS at 13:57

## 2023-01-01 RX ADMIN — SODIUM CHLORIDE, POTASSIUM CHLORIDE, SODIUM LACTATE AND CALCIUM CHLORIDE: 600; 310; 30; 20 INJECTION, SOLUTION INTRAVENOUS at 07:35

## 2023-01-01 RX ADMIN — PHENYLEPHRINE HYDROCHLORIDE 100 MCG: 10 INJECTION INTRAVENOUS at 07:51

## 2023-01-01 RX ADMIN — SODIUM CHLORIDE, POTASSIUM CHLORIDE, SODIUM LACTATE AND CALCIUM CHLORIDE 1000 ML: 600; 310; 30; 20 INJECTION, SOLUTION INTRAVENOUS at 14:15

## 2023-01-01 RX ADMIN — LEVOTHYROXINE SODIUM 88 MCG: 0.09 TABLET ORAL at 05:18

## 2023-01-01 RX ADMIN — OXYCODONE HYDROCHLORIDE 2.5 MG: 5 TABLET ORAL at 22:32

## 2023-01-01 RX ADMIN — METOPROLOL TARTRATE 12.5 MG: 25 TABLET, FILM COATED ORAL at 05:13

## 2023-01-01 RX ADMIN — FENTANYL CITRATE 100 MCG: 50 INJECTION, SOLUTION INTRAMUSCULAR; INTRAVENOUS at 12:49

## 2023-01-01 RX ADMIN — FENTANYL CITRATE 25 MCG: 50 INJECTION, SOLUTION INTRAMUSCULAR; INTRAVENOUS at 12:23

## 2023-01-01 RX ADMIN — PROPOFOL 150 MG: 10 INJECTION, EMULSION INTRAVENOUS at 12:23

## 2023-01-01 RX ADMIN — FENTANYL CITRATE 75 MCG: 50 INJECTION, SOLUTION INTRAMUSCULAR; INTRAVENOUS at 13:28

## 2023-01-01 RX ADMIN — LEVOTHYROXINE SODIUM 88 MCG: 0.09 TABLET ORAL at 05:55

## 2023-01-01 RX ADMIN — SODIUM CHLORIDE, POTASSIUM CHLORIDE, SODIUM LACTATE AND CALCIUM CHLORIDE: 600; 310; 30; 20 INJECTION, SOLUTION INTRAVENOUS at 01:30

## 2023-01-01 RX ADMIN — PHENYLEPHRINE HYDROCHLORIDE 100 MCG: 10 INJECTION INTRAVENOUS at 07:43

## 2023-01-01 RX ADMIN — HEPARIN SODIUM 5000 UNITS: 5000 INJECTION, SOLUTION INTRAVENOUS; SUBCUTANEOUS at 01:34

## 2023-01-01 RX ADMIN — HUMAN ALBUMIN MICROSPHERES AND PERFLUTREN 3 ML: 10; .22 INJECTION, SOLUTION INTRAVENOUS at 20:00

## 2023-01-01 RX ADMIN — LIDOCAINE 1 PATCH: 50 PATCH CUTANEOUS at 13:41

## 2023-01-01 RX ADMIN — OXYCODONE HYDROCHLORIDE 5 MG: 5 TABLET ORAL at 15:53

## 2023-01-01 RX ADMIN — DOCUSATE SODIUM 100 MG: 100 CAPSULE, LIQUID FILLED ORAL at 17:52

## 2023-01-01 RX ADMIN — LEVOTHYROXINE SODIUM 88 MCG: 0.09 TABLET ORAL at 04:47

## 2023-01-01 RX ADMIN — DOCUSATE SODIUM 50 MG AND SENNOSIDES 8.6 MG 2 TABLET: 8.6; 5 TABLET, FILM COATED ORAL at 05:24

## 2023-01-01 RX ADMIN — ONDANSETRON 4 MG: 2 INJECTION INTRAMUSCULAR; INTRAVENOUS at 08:14

## 2023-01-01 RX ADMIN — SENNOSIDES AND DOCUSATE SODIUM 2 TABLET: 50; 8.6 TABLET ORAL at 05:07

## 2023-01-01 RX ADMIN — HEPARIN SODIUM 5000 UNITS: 5000 INJECTION, SOLUTION INTRAVENOUS; SUBCUTANEOUS at 17:39

## 2023-01-01 RX ADMIN — TRANEXAMIC ACID 1000 MG: 100 INJECTION, SOLUTION INTRAVENOUS at 12:47

## 2023-01-01 RX ADMIN — LEVOTHYROXINE SODIUM 88 MCG: 0.09 TABLET ORAL at 04:35

## 2023-01-01 RX ADMIN — DONEPEZIL HYDROCHLORIDE 5 MG: 5 TABLET, FILM COATED ORAL at 17:46

## 2023-01-01 RX ADMIN — LEVOTHYROXINE SODIUM 88 MCG: 0.09 TABLET ORAL at 05:13

## 2023-01-01 RX ADMIN — ROCURONIUM BROMIDE 40 MG: 50 INJECTION, SOLUTION INTRAVENOUS at 12:24

## 2023-01-01 RX ADMIN — EPHEDRINE SULFATE 5 MG: 50 INJECTION INTRAVENOUS at 15:05

## 2023-01-01 RX ADMIN — ONDANSETRON 4 MG: 2 INJECTION INTRAMUSCULAR; INTRAVENOUS at 13:55

## 2023-01-01 RX ADMIN — CITALOPRAM 10 MG: 20 TABLET, FILM COATED ORAL at 17:39

## 2023-01-01 RX ADMIN — ASPIRIN 81 MG: 81 TABLET, COATED ORAL at 06:00

## 2023-01-01 RX ADMIN — LEVOTHYROXINE SODIUM 88 MCG: 0.09 TABLET ORAL at 05:07

## 2023-01-01 RX ADMIN — SODIUM CHLORIDE, POTASSIUM CHLORIDE, SODIUM LACTATE AND CALCIUM CHLORIDE 1000 ML: 600; 310; 30; 20 INJECTION, SOLUTION INTRAVENOUS at 15:30

## 2023-01-01 RX ADMIN — METOPROLOL TARTRATE 12.5 MG: 25 TABLET, FILM COATED ORAL at 09:13

## 2023-01-01 RX ADMIN — HEPARIN SODIUM 5000 UNITS: 5000 INJECTION, SOLUTION INTRAVENOUS; SUBCUTANEOUS at 05:13

## 2023-01-01 RX ADMIN — DONEPEZIL HYDROCHLORIDE 5 MG: 5 TABLET, FILM COATED ORAL at 17:39

## 2023-01-01 RX ADMIN — DEXAMETHASONE SODIUM PHOSPHATE 4 MG: 4 INJECTION INTRA-ARTICULAR; INTRALESIONAL; INTRAMUSCULAR; INTRAVENOUS; SOFT TISSUE at 07:42

## 2023-01-01 RX ADMIN — FENTANYL CITRATE 50 MCG: 50 INJECTION, SOLUTION INTRAMUSCULAR; INTRAVENOUS at 13:12

## 2023-01-01 RX ADMIN — DOCUSATE SODIUM 50 MG AND SENNOSIDES 8.6 MG 2 TABLET: 8.6; 5 TABLET, FILM COATED ORAL at 17:39

## 2023-01-01 RX ADMIN — HEPARIN SODIUM 5000 UNITS: 5000 INJECTION, SOLUTION INTRAVENOUS; SUBCUTANEOUS at 21:34

## 2023-01-01 RX ADMIN — HYDROMORPHONE HYDROCHLORIDE 0.25 MG: 1 INJECTION, SOLUTION INTRAMUSCULAR; INTRAVENOUS; SUBCUTANEOUS at 00:01

## 2023-01-01 RX ADMIN — OXYCODONE HYDROCHLORIDE 5 MG: 5 TABLET ORAL at 16:39

## 2023-01-01 RX ADMIN — DOCUSATE SODIUM 50 MG AND SENNOSIDES 8.6 MG 1 TABLET: 8.6; 5 TABLET, FILM COATED ORAL at 21:34

## 2023-01-01 RX ADMIN — ACETAMINOPHEN 650 MG: 325 TABLET, FILM COATED ORAL at 17:43

## 2023-01-01 RX ADMIN — SODIUM CHLORIDE, POTASSIUM CHLORIDE, SODIUM LACTATE AND CALCIUM CHLORIDE 500 ML: 600; 310; 30; 20 INJECTION, SOLUTION INTRAVENOUS at 23:45

## 2023-01-01 RX ADMIN — LIDOCAINE HYDROCHLORIDE 20 MG: 20 INJECTION, SOLUTION EPIDURAL; INFILTRATION; INTRACAUDAL at 12:23

## 2023-01-01 ASSESSMENT — LIFESTYLE VARIABLES
EVER HAD A DRINK FIRST THING IN THE MORNING TO STEADY YOUR NERVES TO GET RID OF A HANGOVER: NO
HOW MANY TIMES IN THE PAST YEAR HAVE YOU HAD 5 OR MORE DRINKS IN A DAY: 0
AVERAGE NUMBER OF DAYS PER WEEK YOU HAVE A DRINK CONTAINING ALCOHOL: 0
EVER FELT BAD OR GUILTY ABOUT YOUR DRINKING: NO
TOTAL SCORE: 0
HAVE PEOPLE ANNOYED YOU BY CRITICIZING YOUR DRINKING: NO
HAVE YOU EVER FELT YOU SHOULD CUT DOWN ON YOUR DRINKING: NO
ALCOHOL_USE: NO
TOTAL SCORE: 0
ON A TYPICAL DAY WHEN YOU DRINK ALCOHOL HOW MANY DRINKS DO YOU HAVE: 0
CONSUMPTION TOTAL: NEGATIVE
TOTAL SCORE: 0

## 2023-01-01 ASSESSMENT — COGNITIVE AND FUNCTIONAL STATUS - GENERAL
DRESSING REGULAR UPPER BODY CLOTHING: A LOT
DAILY ACTIVITIY SCORE: 11
TURNING FROM BACK TO SIDE WHILE IN FLAT BAD: A LOT
DRESSING REGULAR UPPER BODY CLOTHING: A LOT
MOVING TO AND FROM BED TO CHAIR: A LOT
MOVING FROM LYING ON BACK TO SITTING ON SIDE OF FLAT BED: A LOT
PERSONAL GROOMING: A LOT
MOBILITY SCORE: 13
SUGGESTED CMS G CODE MODIFIER DAILY ACTIVITY: CL
EATING MEALS: A LITTLE
EATING MEALS: A LITTLE
DRESSING REGULAR LOWER BODY CLOTHING: TOTAL
MOVING FROM LYING ON BACK TO SITTING ON SIDE OF FLAT BED: UNABLE
WALKING IN HOSPITAL ROOM: A LITTLE
HELP NEEDED FOR BATHING: TOTAL
TURNING FROM BACK TO SIDE WHILE IN FLAT BAD: A LOT
MOVING TO AND FROM BED TO CHAIR: UNABLE
CLIMB 3 TO 5 STEPS WITH RAILING: A LOT
DRESSING REGULAR LOWER BODY CLOTHING: TOTAL
WALKING IN HOSPITAL ROOM: A LITTLE
CLIMB 3 TO 5 STEPS WITH RAILING: TOTAL
STANDING UP FROM CHAIR USING ARMS: A LOT
SUGGESTED CMS G CODE MODIFIER MOBILITY: CL
TOILETING: TOTAL
MOVING TO AND FROM BED TO CHAIR: A LOT
HELP NEEDED FOR BATHING: TOTAL
MOVING FROM LYING ON BACK TO SITTING ON SIDE OF FLAT BED: A LOT
TOILETING: TOTAL
DAILY ACTIVITIY SCORE: 10
STANDING UP FROM CHAIR USING ARMS: A LOT
STANDING UP FROM CHAIR USING ARMS: TOTAL
DRESSING REGULAR UPPER BODY CLOTHING: A LOT
MOBILITY SCORE: 6
PERSONAL GROOMING: A LITTLE
SUGGESTED CMS G CODE MODIFIER DAILY ACTIVITY: CL
WALKING IN HOSPITAL ROOM: TOTAL
HELP NEEDED FOR BATHING: TOTAL
PERSONAL GROOMING: A LOT
TURNING FROM BACK TO SIDE WHILE IN FLAT BAD: UNABLE
TOILETING: TOTAL
DRESSING REGULAR LOWER BODY CLOTHING: TOTAL
CLIMB 3 TO 5 STEPS WITH RAILING: A LOT
SUGGESTED CMS G CODE MODIFIER MOBILITY: CN
SUGGESTED CMS G CODE MODIFIER DAILY ACTIVITY: CL
DAILY ACTIVITIY SCORE: 10
SUGGESTED CMS G CODE MODIFIER MOBILITY: CL
EATING MEALS: A LITTLE
MOBILITY SCORE: 13

## 2023-01-01 ASSESSMENT — ENCOUNTER SYMPTOMS
FALLS: 1
FALLS: 0
HEADACHES: 0
NAUSEA: 0
SHORTNESS OF BREATH: 0
FALLS: 0
MYALGIAS: 0
FEVER: 0
CHILLS: 0
PALPITATIONS: 0
SHORTNESS OF BREATH: 0
COUGH: 0
NAUSEA: 0
SHORTNESS OF BREATH: 0
PALPITATIONS: 0
ABDOMINAL PAIN: 0
NAUSEA: 0
FEVER: 0
DIZZINESS: 0
HEADACHES: 0
PALPITATIONS: 0
HEADACHES: 0
SPUTUM PRODUCTION: 0
COUGH: 0
VOMITING: 0
DIZZINESS: 0
FEVER: 0
COUGH: 0
VOMITING: 0
PALPITATIONS: 0
NECK PAIN: 0
CHILLS: 0
VOMITING: 0
MYALGIAS: 0
SHORTNESS OF BREATH: 0
ABDOMINAL PAIN: 0
CHILLS: 0
ABDOMINAL PAIN: 0

## 2023-01-01 ASSESSMENT — FIBROSIS 4 INDEX
FIB4 SCORE: 3.78
FIB4 SCORE: 5.25
FIB4 SCORE: 4.38
FIB4 SCORE: 5.25
FIB4 SCORE: 1.43

## 2023-01-01 ASSESSMENT — PATIENT HEALTH QUESTIONNAIRE - PHQ9
2. FEELING DOWN, DEPRESSED, IRRITABLE, OR HOPELESS: NOT AT ALL
1. LITTLE INTEREST OR PLEASURE IN DOING THINGS: NOT AT ALL
SUM OF ALL RESPONSES TO PHQ9 QUESTIONS 1 AND 2: 0
SUM OF ALL RESPONSES TO PHQ9 QUESTIONS 1 AND 2: 0
2. FEELING DOWN, DEPRESSED, IRRITABLE, OR HOPELESS: NOT AT ALL
1. LITTLE INTEREST OR PLEASURE IN DOING THINGS: NOT AT ALL

## 2023-01-01 ASSESSMENT — PAIN DESCRIPTION - PAIN TYPE
TYPE: ACUTE PAIN
TYPE: SURGICAL PAIN
TYPE: ACUTE PAIN
TYPE: SURGICAL PAIN
TYPE: ACUTE PAIN;SURGICAL PAIN
TYPE: SURGICAL PAIN
TYPE: SURGICAL PAIN
TYPE: ACUTE PAIN
TYPE: SURGICAL PAIN
TYPE: ACUTE PAIN
TYPE: SURGICAL PAIN
TYPE: SURGICAL PAIN
TYPE: ACUTE PAIN
TYPE: SURGICAL PAIN
TYPE: SURGICAL PAIN
TYPE: ACUTE PAIN
TYPE: ACUTE PAIN

## 2023-01-01 ASSESSMENT — PAIN SCALES - GENERAL
PAIN_LEVEL: 0
PAIN_LEVEL: 0

## 2023-01-01 ASSESSMENT — ACTIVITIES OF DAILY LIVING (ADL): TOILETING: INDEPENDENT

## 2023-01-01 ASSESSMENT — GAIT ASSESSMENTS
GAIT LEVEL OF ASSIST: MINIMAL ASSIST
ASSISTIVE DEVICE: FRONT WHEEL WALKER
DEVIATION: ANTALGIC

## 2023-10-21 PROBLEM — E03.9 HYPOTHYROIDISM: Status: ACTIVE | Noted: 2023-01-01

## 2023-10-21 PROBLEM — R79.89 ELEVATED TROPONIN: Status: ACTIVE | Noted: 2023-01-01

## 2023-10-21 PROBLEM — G30.9 ALZHEIMER DISEASE (HCC): Status: ACTIVE | Noted: 2023-01-01

## 2023-10-21 PROBLEM — D72.829 LEUCOCYTOSIS: Status: ACTIVE | Noted: 2023-01-01

## 2023-10-21 PROBLEM — Z71.89 GOALS OF CARE, COUNSELING/DISCUSSION: Status: ACTIVE | Noted: 2023-01-01

## 2023-10-21 PROBLEM — R79.89 TROPONIN LEVEL ELEVATED: Status: ACTIVE | Noted: 2023-01-01

## 2023-10-21 PROBLEM — N17.9 AKI (ACUTE KIDNEY INJURY) (HCC): Status: ACTIVE | Noted: 2023-01-01

## 2023-10-21 PROBLEM — S72.141A CLOSED INTERTROCHANTERIC FRACTURE OF RIGHT FEMUR (HCC): Status: ACTIVE | Noted: 2023-01-01

## 2023-10-21 PROBLEM — E87.20 LACTIC ACID ACIDOSIS: Status: ACTIVE | Noted: 2023-01-01

## 2023-10-21 PROBLEM — F02.80 ALZHEIMER DISEASE (HCC): Status: ACTIVE | Noted: 2023-01-01

## 2023-10-21 NOTE — H&P
Hospital Medicine History & Physical Note    Date of Service  10/21/2023    Primary Care Physician  Pcp Pt States None    Consultants  orthopedics    Specialist Names: Dr mitchell     Code Status  Full Code    Chief Complaint  Chief Complaint   Patient presents with    Fall    Hip Injury     Known right intertrochanteric fracture s/p fall       History of Presenting Illness  Kathleen Mendieta is a 89 y.o. female with past medical history of hypothyroidism, Alzheimer dementia, who was referred from the facility 10/21/2023 with right intertrochanteric fracture.    Patient sustained fall earlier today.  Subsequent CT C-spine, CT head unremarkable, pelvic x-ray noted with right intertrochanteric fracture.  Labs a lot of facility noted with leukocytosis white count 20,000, glucose 216, creatinine 1.82, lactic acid 6.9, UA negative for infection, COVID-negative, received 1 g Rocephin.    Per patient's son who is at the bedside helping with history taking.  Patient fell down today while using bathroom.  Denies any chest pain, shortness of breath, nausea/vomiting, fever.  Denies any focal weakness/numbness, syncopal episode.  Patient does have a history of underlying heart disease or renal disease.       On arrival to [facility] ED, she remained tachycardic.Repeat labs here noted with leukocytosis white count 16.2, hemoglobin 10.5 glucose 143, elevated BUN/creatinine, lactic acid 4.4, elevated troponin 62, procalcitonin 0.33.  Chest x-ray unremarkable.  EKG noted with sinus tachycardia with nonspecific ST changes.    Orthopedic Dr. Escobar evaluated and plan is for OR tomorrow for right intertrochanteric femur fracture fixation.    I spoke and discussed the case with the ER physician, Dr. Palacio .    Patient will be admitted to the hospital for further evaluation and management for   right intertrochanteric femur fracture requiring surgical intervention.  Elevated troponin need close telemetry monitoring including serial labs,  echocardiogram.  Significant lactacidosis requiring IV fluid, IV antibiotics and further work-up.      I discussed the plan of care with patient and family.    Review of Systems  Review of Systems   Respiratory:  Negative for cough, sputum production and shortness of breath.    Cardiovascular:  Negative for chest pain and palpitations.   Gastrointestinal:  Negative for nausea and vomiting.   Musculoskeletal:  Positive for falls and joint pain.       Past Medical History   has a past medical history of Dementia (HCC), Glaucoma, Heart failure (HCC), and Hypothyroidism.    Surgical History   has no past surgical history on file.     Family History  family history is not on file.   Family history reviewed with patient. There is no family history that is pertinent to the chief complaint.     Social History   reports that she has never smoked. She has never used smokeless tobacco.    Allergies  No Known Allergies    Medications  None       Physical Exam  Temp:  [36.2 °C (97.2 °F)] 36.2 °C (97.2 °F)  Pulse:  [] 92  Resp:  [16-20] 16  BP: (110-128)/(55-56) 128/56  SpO2:  [96 %-98 %] 98 %  Blood Pressure : 128/56   Temperature: 36.2 °C (97.2 °F)   Pulse: 92   Respiration: 16   Pulse Oximetry: 98 %       Physical Exam  Constitutional:       General: She is not in acute distress.     Appearance: She is not ill-appearing.   HENT:      Mouth/Throat:      Mouth: Mucous membranes are dry.   Cardiovascular:      Rate and Rhythm: Normal rate and regular rhythm.      Pulses: Normal pulses.      Heart sounds: Normal heart sounds.   Pulmonary:      Effort: Pulmonary effort is normal. No respiratory distress.      Breath sounds: Normal breath sounds. No wheezing.   Abdominal:      General: Abdomen is flat. There is no distension.   Musculoskeletal:      Right lower leg: No edema.      Left lower leg: No edema.      Comments: Range of motion limited in right hip due to pain   Skin:     General: Skin is warm.   Neurological:       "General: No focal deficit present.      Mental Status: She is alert.      Comments: Alert oriented x2         Laboratory:  Recent Labs     10/21/23  1427   WBC 16.2*   RBC 3.32*   HEMOGLOBIN 10.5*   HEMATOCRIT 32.4*   MCV 97.6   MCH 31.6   MCHC 32.4   RDW 53.0*   PLATELETCT 282   MPV 9.5     Recent Labs     10/21/23  1427   SODIUM 137   POTASSIUM 5.0   CHLORIDE 104   CO2 20   GLUCOSE 143*   BUN 14   CREATININE 1.65*   CALCIUM 8.2*     Recent Labs     10/21/23  1427   ALTSGPT 19   ASTSGOT 38   ALKPHOSPHAT 100*   TBILIRUBIN 0.5   GLUCOSE 143*     Recent Labs     10/21/23  1427   APTT 24.2*   INR 1.15*     No results for input(s): \"NTPROBNP\" in the last 72 hours.      Recent Labs     10/21/23  1427   TROPONINT 62*       Imaging:  DX-CHEST-PORTABLE (1 VIEW)   Final Result         1.  Mild cardiomegaly.      2.  No consolidations identified.      3.  Elevation of right hemidiaphragm.      OUTSIDE IMAGES-CT CERVICAL SPINE   Final Result      OUTSIDE IMAGES-CT HEAD   Final Result      OUTSIDE IMAGES-DX LOWER EXTREMITY, RIGHT   Final Result      OUTSIDE IMAGES-DX PELVIS   Final Result      EC-ECHOCARDIOGRAM COMPLETE W/O CONT    (Results Pending)       X-Ray:  I have personally reviewed the images and compared with prior images.  EKG:  I have personally reviewed the images and compared with prior images.    Assessment/Plan:  Justification for Admission Status  I anticipate this patient will require at least two midnights for appropriate medical management, necessitating inpatient admission   for further evaluation and management for   right intertrochanteric femur fracture requiring surgical intervention.  Elevated troponin need close telemetry monitoring including serial labs, echocardiogram.  Significant lactacidosis requiring IV fluid, IV antibiotics and further work-up.      * Closed intertrochanteric fracture of right femur (HCC)  Assessment & Plan  Orthopedics consulted, plan for OR   Multimodal pain control  Consider " physical and Occupational Therapy  Requiring IV narcotics, monitor for toxicity    Alzheimer disease (HCC)  Assessment & Plan  Alzheimer's dementia  Patient is AO x2 which is her baseline  She is able to participate in communication    Hypothyroidism  Assessment & Plan  Continue levothyroxine    Goals of care, counseling/discussion  Assessment & Plan  I had a prolonged discussion with the patient's son who is at the bedside regarding goals of care, diagnoses, prognosis, and CODE STATUS. We discussed her prognosis and comorbidities. I spent 17 minutes on advanced care planning in addition to the time spent managing the other medical problems.  CODE STATUS changed to DNR/DNI .     Troponin level elevated  Assessment & Plan  For patient's son she does not have any underlying cardiac history.  Elevated troponin likely in setting of demand ischemia, and underlying ANETTE, EKG unremarkable  Unlikely ACS  Denies chest pain, shortness of breath  Continue to trend troponin  Telemetry monitoring, monitor EKG  Get echocardiogram to evaluate wall motion abnormalities    Leucocytosis  Assessment & Plan  Leukocytosis likely stress-induced  Slight elevated procalcitonin  UA negative for UTI, chest is unremarkable  Given significant lactic acidosis we will continue antibiotic for now   follow-up blood culture,   De-escalate antibiotic as needed      Lactic acid acidosis  Assessment & Plan  Initial lactic acid 6.2 at outside facility, improved to 4.4  Continue IV fluid - watch for fluid overload   Trend lactic acid       ANETTE (acute kidney injury) (HCC)  Assessment & Plan  Mostly due to dehydration   Intravenous fluids  Avoid nephrotoxins, monitor inputs and outputs  Renal ultrasound  Bladder scan  UA, urine electrolytes  Nephrology evaluation if renal function continue to worsens        VTE prophylaxis: SCDs/TEDs and heparin ppx    I independently reviewed pertinent clinical lab tests since admission and ordered additional follow up  clinical lab tests.  Admission order was placed.  Labs ordered for follow-up.  I independently reviewed pertinent radiographic images and the radiologist's reports since admission and ordered additional follow up radiographic studies.  The details of the available patient records in [facility] (including laboratory tests, culture data, medications, imaging, and other pertinent diagnostic tests) and that information was utilized as warranted in today's medical decision making for this patient.  I personally evaluated the patient condition at bedside.     Care interventions include:   Review of interval medical and surgical history, current medications and outpatient medication reconciliation, interval imaging studies and radiologist interpretation, and interval laboratory values.

## 2023-10-21 NOTE — ASSESSMENT & PLAN NOTE
Leukocytosis likely stress-induced  Slight elevated procalcitonin  UA negative for UTI, chest is unremarkable  Stop antibiotics  Check CBC tomorrow

## 2023-10-21 NOTE — ED PROVIDER NOTES
ED Provider Note    CHIEF COMPLAINT  No chief complaint on file.      EXTERNAL RECORDS REVIEWED  External ED Note seen at an outside hospital after the patient's son, with whom she lives, found her on the floor in the bathroom.  She was unable to get up on her own and reported pain in her right hip/leg.  He had previously seen her 30 minutes before, lying in bed, and at her baseline.  She does have a history of dementia and is alert and oriented x2-3 at her baseline per family.  CT C-spine did not reveal acute fracture or traumatic injury.  CT head did not reveal acute abnormality.  White blood cell count was elevated to 20,000.  Blood glucose was elevated to 216, she does not have any known history of diabetes.  Creatinine was elevated at 1.82.  CK was normal.  Lactic acid was quite elevated to 6.9.  Pelvic x-rays revealed a right intertrochanteric fracture.  UA did not suggest infection.  She was given IV Tylenol, fentanyl, and 1 g of ceftriaxone.  COVID-19 test was negative.    HPI/ROS  LIMITATION TO HISTORY   Select: : None  OUTSIDE HISTORIAN(S):  None    Kathleen Mendieta is a 89 y.o. female, ambulates independently at baseline, who presents as a transfer from an outside facility for known right femoral neck fracture.  Patient reports she sustained a fall earlier today after losing consciousness while standing in the bathroom.  She does not have any real recollection of the event.  She was initially taken to an outside facility where she was found to have a significantly elevated white blood cell count and lactic acidosis.  The patient herself reports that she was in her baseline state of health until the episode this morning.  She denies any fevers or chills, chest pain or shortness of breath, nausea or vomiting, diarrhea or constipation, dysuria.    PAST MEDICAL HISTORY       SURGICAL HISTORY  patient denies any surgical history    FAMILY HISTORY  No family history on file.    SOCIAL HISTORY  Social History  "    Tobacco Use    Smoking status: Not on file    Smokeless tobacco: Not on file   Substance and Sexual Activity    Alcohol use: Not on file    Drug use: Not on file    Sexual activity: Not on file       CURRENT MEDICATIONS  Home Medications    **Home medications have not yet been reviewed for this encounter**         ALLERGIES  Not on File    PHYSICAL EXAM  VITAL SIGNS: /56   Pulse 92   Temp 36.2 °C (97.2 °F) (Temporal)   Resp 16   Ht 1.575 m (5' 2\")   Wt 59 kg (130 lb)   SpO2 98%   BMI 23.78 kg/m²    Physical Exam  Vitals and nursing note reviewed.   Constitutional:       Appearance: Normal appearance.   HENT:      Head: Normocephalic and atraumatic.      Right Ear: External ear normal.      Left Ear: External ear normal.      Nose: Nose normal.      Mouth/Throat:      Mouth: Mucous membranes are dry.   Eyes:      Extraocular Movements: Extraocular movements intact.      Conjunctiva/sclera: Conjunctivae normal.      Pupils: Pupils are equal, round, and reactive to light.   Cardiovascular:      Rate and Rhythm: Normal rate and regular rhythm.      Pulses: Normal pulses.      Heart sounds: Normal heart sounds.   Pulmonary:      Effort: Pulmonary effort is normal.   Abdominal:      Palpations: Abdomen is soft.      Tenderness: There is no abdominal tenderness.   Musculoskeletal:      Cervical back: Normal range of motion and neck supple.      Comments: Right lower extremity is shortened and externally rotated.  There is tenderness in the right inguinal region.   Skin:     General: Skin is warm and dry.   Neurological:      General: No focal deficit present.      Mental Status: She is alert and oriented to person, place, and time.      Comments: Full sensation in right lower extremity   Psychiatric:         Mood and Affect: Mood normal.         Behavior: Behavior normal.       DIAGNOSTIC STUDIES / PROCEDURES  LABS  Results for orders placed or performed during the hospital encounter of 10/21/23   CBC With " Differential   Result Value Ref Range    WBC 16.2 (H) 4.8 - 10.8 K/uL    RBC 3.32 (L) 4.20 - 5.40 M/uL    Hemoglobin 10.5 (L) 12.0 - 16.0 g/dL    Hematocrit 32.4 (L) 37.0 - 47.0 %    MCV 97.6 81.4 - 97.8 fL    MCH 31.6 27.0 - 33.0 pg    MCHC 32.4 32.2 - 35.5 g/dL    RDW 53.0 (H) 35.9 - 50.0 fL    Platelet Count 282 164 - 446 K/uL    MPV 9.5 9.0 - 12.9 fL    Neutrophils-Polys 89.90 (H) 44.00 - 72.00 %    Lymphocytes 3.30 (L) 22.00 - 41.00 %    Monocytes 5.90 0.00 - 13.40 %    Eosinophils 0.00 0.00 - 6.90 %    Basophils 0.20 0.00 - 1.80 %    Immature Granulocytes 0.70 0.00 - 0.90 %    Nucleated RBC 0.00 0.00 - 0.20 /100 WBC    Neutrophils (Absolute) 14.54 (H) 1.82 - 7.42 K/uL    Lymphs (Absolute) 0.54 (L) 1.00 - 4.80 K/uL    Monos (Absolute) 0.95 (H) 0.00 - 0.85 K/uL    Eos (Absolute) 0.00 0.00 - 0.51 K/uL    Baso (Absolute) 0.03 0.00 - 0.12 K/uL    Immature Granulocytes (abs) 0.12 (H) 0.00 - 0.11 K/uL    NRBC (Absolute) 0.00 K/uL   Comp Metabolic Panel   Result Value Ref Range    Sodium 137 135 - 145 mmol/L    Potassium 5.0 3.6 - 5.5 mmol/L    Chloride 104 96 - 112 mmol/L    Co2 20 20 - 33 mmol/L    Anion Gap 13.0 7.0 - 16.0    Glucose 143 (H) 65 - 99 mg/dL    Bun 14 8 - 22 mg/dL    Creatinine 1.65 (H) 0.50 - 1.40 mg/dL    Calcium 8.2 (L) 8.5 - 10.5 mg/dL    Correct Calcium 8.8 8.5 - 10.5 mg/dL    AST(SGOT) 38 12 - 45 U/L    ALT(SGPT) 19 2 - 50 U/L    Alkaline Phosphatase 100 (H) 30 - 99 U/L    Total Bilirubin 0.5 0.1 - 1.5 mg/dL    Albumin 3.2 3.2 - 4.9 g/dL    Total Protein 5.9 (L) 6.0 - 8.2 g/dL    Globulin 2.7 1.9 - 3.5 g/dL    A-G Ratio 1.2 g/dL   Lactic Acid   Result Value Ref Range    Lactic Acid 4.4 (HH) 0.5 - 2.0 mmol/L   Prothrombin Time   Result Value Ref Range    PT 14.8 (H) 12.0 - 14.6 sec    INR 1.15 (H) 0.87 - 1.13   APTT   Result Value Ref Range    APTT 24.2 (L) 24.7 - 36.0 sec   ESTIMATED GFR   Result Value Ref Range    GFR (CKD-EPI) 29 (A) >60 mL/min/1.73 m 2   EKG   Result Value Ref Range    Report        [facility].    Test Date:  2023-10-21  Pt Name:    ANABEL COX                  Department: ER  MRN:        [id number]                      Room:       BL 16  Gender:     Female                       Technician: [id number]  :        [id number]                   Requested By:RAÚL FLOYD  Order #:    [id number]                    Reading MD: Raúl Floyd MD    Measurements  Intervals                                Axis  Rate:       109                          P:          11  RI:         188                          QRS:        0  QRSD:       75                           T:          131  QT:         332  QTc:        448    Interpretive Statements  Sinus tachycardia  Abnormal T, consider ischemia, lateral leads  Compared to ECG 2022 11:04:07  T-wave abnormality now present  Possible ischemia now present  Sinus rhythm no longer present  Atrial premature complex(es) no longer present  Poor R-wave progression no longer present  Electronically Signed On 10 - 15:23:12 PDT by Raúl Floyd MD       RADIOLOGY  I have independently interpreted the diagnostic imaging associated with this visit and am waiting the final reading from the radiologist.   My preliminary interpretation is as follows: Right intertrochanteric fracture    Radiologist interpretation:   OUTSIDE IMAGES-CT CERVICAL SPINE   Final Result      OUTSIDE IMAGES-CT HEAD   Final Result      OUTSIDE IMAGES-DX LOWER EXTREMITY, RIGHT   Final Result      OUTSIDE IMAGES-DX PELVIS   Final Result      DX-CHEST-PORTABLE (1 VIEW)    (Results Pending)     COURSE & MEDICAL DECISION MAKING    ED Observation Status? No; Patient does not meet criteria for ED Observation.     INITIAL ASSESSMENT, COURSE AND PLAN  Care Narrative: This is a 89-year-old female who is typically independently ambulatory sent here from an outside facility after a ground-level fall with known right intertrochanteric femur fracture.  Patient had some lab  abnormalities at the outside facility and received a dose of ceftriaxone prior to transfer.    She arrives here tachycardic but afebrile with otherwise normal vital signs.  She appears dehydrated with tacky mucous membranes but nontoxic.  Her right lower extremity is shortened and externally rotated but she is neurovascularly intact in the extremity.  The remainder of her exam is reassuring without focal abnormalities.    Patient was started on IV fluids for evidence of dehydration and tachycardia.  CBC reveals leukocytosis to 16.2 with neutrophilic predominance.  Metabolic panel reveals normal electrolytes.  She has a creatinine of 1.65 and significantly decreased GFR.  Unclear renal baseline but based on paperwork that was sent with the patient from [facility] she does not have a history of CKD.  She does have a history of CHF with unknown ejection fraction but she is not on a diuretic.    Initial lactic acid here is elevated to 4.4.  She apparently received 250 cc of IV fluid at the outside facility and had some fluids running in route.  She does not appear to be fluid overloaded-external chest x-ray without evidence of pulmonary edema, lower extremities without edema.  A 1 L IV fluid bolus was obtained.    Chest x-ray here is without acute abnormality.  Mild cardiomegaly is noted.    Procalcitonin was obtained which is very slightly elevated, it remains unclear if there is an underlying infectious etiology leading to her leukocytosis and tachycardia.  She already received a dose of antibiotics and I do not feel strongly without finding a source here that she needs additional antibiotic management.    EKG suggests ischemia with T wave inversions in the lateral leads and ST depressions in the anterior lateral leads.  Troponin is elevated to 62.    I spoke with an OR nurse on Dr. Greene's behalf for orthopedic surgery.  He was made aware of the patient.  He is also aware that I feel the patient will require  hospitalist evaluation with continued resuscitation prior to any surgical intervention. Patient was subsequently discussed with the hospitalist and she was admitted in guarded condition.    HYDRATION: Based on the patient's presentation of Dehydration and Tachycardia the patient was given IV fluids. IV Hydration was used because oral hydration was not adequate alone. Upon recheck following hydration, the patient was improved.    ADDITIONAL PROBLEM LIST  None  DISPOSITION AND DISCUSSIONS  I have discussed management of the patient with the following physicians and WIN's: Dr. Greene, orthopedic surgery.    Discussion of management with other [facility] or appropriate source(s): None     Escalation of care considered, and ultimately not performed: N/A.    Barriers to care at this time, including but not limited to:  None .     Decision tools and prescription drugs considered including, but not limited to: N/A.    FINAL DIAGNOSIS  Dehydration  Right intertrochanteric femur fracture   Elevated troponin  Lactic acidosis  Abnormal EKG    Electronically signed by: Onofre Palacio M.D., 10/21/2023 1:36 PM

## 2023-10-21 NOTE — ED TRIAGE NOTES
"Chief Complaint   Patient presents with    Fall    Hip Injury     Known right intertrochanteric fracture s/p fall     /55   Pulse (!) 114   Temp 36.2 °C (97.2 °F) (Temporal)   Resp 20   Ht 1.575 m (5' 2\")   Wt 59 kg (130 lb)   SpO2 96%   BMI 23.78 kg/m²     89 y/o female presents to ED as transfer from [place] for a known right intertrochanteric fracture. Patient experienced a MGLF this morning in the bathroom. She was found by her son at 0730. Suspected to have been down for ~30 minutes. Patient was also found to have a WBC 20 and lactic acid of 6.9. Limited hx available.     Awake, alert on arrival.   "

## 2023-10-21 NOTE — ASSESSMENT & PLAN NOTE
Initial lactic acid 6.2 at outside facility, improved to 4.4 with IV fluids  This does not appear to be infectious thus stop antibiotics

## 2023-10-21 NOTE — ASSESSMENT & PLAN NOTE
Alzheimer's dementia  Patient is AO x2 which is her baseline  She is able to participate in communication

## 2023-10-21 NOTE — ASSESSMENT & PLAN NOTE
I had a prolonged discussion with the patient's son who is at the bedside regarding goals of care, diagnoses, prognosis, and CODE STATUS. We discussed her prognosis and comorbidities. I spent 17 minutes on advanced care planning in addition to the time spent managing the other medical problems.  CODE STATUS changed to DNR/DNI .

## 2023-10-21 NOTE — PROGRESS NOTES
89-year-old female ground-level fall with right intertrochanteric femur fracture  Plan to admit to medicine for preoperative optimization and pain control  OR tomorrow for fixation right intertrochanteric femur fracture  N.p.o. at midnight        Jc Escobar MD

## 2023-10-21 NOTE — ASSESSMENT & PLAN NOTE
Likely sequelae of urinary retention with bladder scan consistently >300  Renal ultrasound unremarkable.  Cr 1.65 --> 1.84

## 2023-10-21 NOTE — ED NOTES
Med rec updated and complete. Allergies reviewed. Confirmed name and date of birth   With family at bedside.  No antibiotic use in last 30 days.  No anticoagulant or antiplatelet medications.    Pt takes all her medications in the evening including her levothyroxine.    Home pharmacy  [facility] = [phone]

## 2023-10-21 NOTE — ASSESSMENT & PLAN NOTE
Status post surgical fixation by Dr. Escobar on 10/22  Multimodal pain control  Consider physical and Occupational Therapy  Requiring IV narcotics, monitor for toxicity

## 2023-10-22 PROBLEM — Z66 DNR (DO NOT RESUSCITATE): Status: ACTIVE | Noted: 2023-01-01

## 2023-10-22 PROBLEM — J96.01 ACUTE RESPIRATORY FAILURE WITH HYPOXIA (HCC): Status: ACTIVE | Noted: 2023-01-01

## 2023-10-22 PROBLEM — Z71.89 GOALS OF CARE, COUNSELING/DISCUSSION: Status: RESOLVED | Noted: 2023-01-01 | Resolved: 2023-01-01

## 2023-10-22 NOTE — ASSESSMENT & PLAN NOTE
Post-operatively her oxygen saturations have dropped and she is requiring 10 liters of oxygen.  NPO until speech path sees her given the risk of post-anesthesia aspiration (family notes that she has trouble swallowing baseline).  CXR reviewed  Now on 1L supplemental oxygen

## 2023-10-22 NOTE — ANESTHESIA PREPROCEDURE EVALUATION
Case: [id number] Date/Time: 10/22/23 0715    Procedure: INSERTION, INTRAMEDULLARY DORYS, FEMUR, PROXIMAL (Right)    Location: [facility] OR 11 / SURGERY [facility]    Surgeons: Jc Escobar M.D.        Alzheimer's dementia (unclear on date/time, but understands why she is having surgery and able to sign her own consent), ANETTE, lactic acidosis (improving).  Denies: MI/CHF/smoking/CVA/DM  TTE report from yesterday reviewed      Relevant Problems      (positive) ANETTE (acute kidney injury) (HCC)      ENDO   (positive) Hypothyroidism       Physical Exam    Airway   Mallampati: II  TM distance: >3 FB  Neck ROM: full       Cardiovascular - normal exam  Rhythm: regular  Rate: normal  (-) murmur     Dental       Very poor dentition   Pulmonary - normal exam  Breath sounds clear to auscultation     Abdominal    Neurological - normal exam                 Anesthesia Plan    ASA 2       Plan - general       Airway plan will be LMA          Induction: intravenous    Postoperative Plan: Postoperative administration of opioids is intended.    Pertinent diagnostic labs and testing reviewed    Informed Consent:    Anesthetic plan and risks discussed with patient.    Use of blood products discussed with: patient whom consented to blood products.

## 2023-10-22 NOTE — PROGRESS NOTES
4 Eyes Skin Assessment Completed by ASA Lopez and ASA Correa.    Head WDL  Ears Redness and Blanching  Nose WDL  Mouth WDL  Neck WDL  Breast/Chest WDL  Shoulder Blades WDL  Spine WDL  (R) Arm/Elbow/Hand WDL  (L) Arm/Elbow/Hand WDL  Abdomen WDL  Groin WDL  Scrotum/Coccyx/Buttocks Redness and Blanching  (R) Leg skin intact, right leg externally rotated and shorter than left leg  (L) Leg WDL  (R) Heel/Foot/Toe WDL  (L) Heel/Foot/Toe WDL          Devices In Places Tele Box, Blood Pressure Cuff, Pulse Ox, and Nasal Cannula      Interventions In Place Gray Ear Foams, Waffle Overlay, Pillows, Q2 Turns, and Pressure Redistribution Mattress    Possible Skin Injury No    Pictures Uploaded Into Epic N/A  Wound Consult Placed N/A  RN Wound Prevention Protocol Ordered No

## 2023-10-22 NOTE — PROGRESS NOTES
Roel Rojas M.D.notified, patient had an aspiration event with thin liquids and required suctioning. Patient was able to clear her throat after suctioning and coughing. Pt is currently on 10L oxymask for 6hrs, per ortho surg recommendation saturation at 100%.   Per Dr Rojas, SLP ordrered, CXR ordered. Pt is strict NPO until SLP eval.

## 2023-10-22 NOTE — CARE PLAN
The patient is Stable - Low risk of patient condition declining or worsening    Shift Goals  Clinical Goals: surgery  Patient Goals: rest, surgery    Progress made toward(s) clinical / shift goals:      Problem: Fall Risk  Goal: Patient will remain free from falls  Outcome: Progressing     Problem: Skin Integrity  Goal: Skin integrity is maintained or improved  Outcome: Progressing     Problem: Respiratory  Goal: Patient will achieve/maintain optimum respiratory ventilation and gas exchange  Outcome: Progressing     Problem: Risk for Aspiration  Goal: Patient's risk for aspiration will be absent or decrease  Outcome: Progressing     Problem: Nutrition  Goal: Patient's nutritional and fluid intake will be adequate or improve  Outcome: Progressing       Patient is not progressing towards the following goals:

## 2023-10-22 NOTE — ANESTHESIA PROCEDURE NOTES
Airway    Date/Time: 10/22/2023 7:39 AM    Performed by: Gabino Sky M.D.  Authorized by: Gabino Sky M.D.    Location:  OR  Urgency:  Elective  Indications for Airway Management:  Anesthesia      Spontaneous Ventilation: absent    Sedation Level:  Deep  Preoxygenated: Yes    Final Airway Type:  Supraglottic airway  Final Supraglottic Airway:  Standard LMA    SGA Size:  3  Number of Attempts at Approach:  1   Atraumatic insertion, good seal

## 2023-10-22 NOTE — OP REPORT
DATE OF OPERATION: 10/22/2023     PREOPERATIVE DIAGNOSIS: Right intertrochanteric femur fracture    POSTOPERATIVE DIAGNOSIS: Same    PROCEDURE PERFORMED: Right femur cephalomedullary nail    SURGEON: Jc Escobar M.D.     ASSISTANT: None    ANESTHESIA: General    SPECIMEN: None    ESTIMATED BLOOD LOSS: 30 mL    IMPLANTS: OIC short nail, 85 mm lag screw, 32.5 mm distal interlock screw      INDICATIONS: The patient is a 89 y.o. female who presented with above-mentioned injury.  I discussed the risks and benefits of the procedure which include but are not limited to risks of infection, wound healing complication, neurovascular injury, pain, malunion, non-union, malrotation, and the medical risks of anesthesia including MI, stroke, and death.  Alternatives to surgery were also discussed, including non-operative management, which I did not recommend.  The patient was in agreement with the plan to proceed, and the informed consent was signed and documented.  I met with the patient pre-operatively and marked the operative extremity with their agreement.  We proceeded to the operating room.     DESCRIPTION OF PROCEDURE:  Patient was seen in the preoperative holding area on the day of surgery. The operative site was marked with my initials.  she was taken to the operating room and placed supine on the operative table.  Anesthesia was induced.  The operative extremity was prepped and draped in the normal sterile fashion.  Operative pause was conducted and the correct patient, site, side, procedure, and surgeon's initials on extremity were identified.  Combination axial traction adduction internal rotation used to reduce fracture.  There is still large anterior apex deformity which corrected with anterior posterior pressure.  At this time we obtained the drill for the lag screw and noted that there was a guidepin stuck in the center of the drill.  Discussion was made how long it would take to get a another set which  would have been delayed per report.  Based on her age of 89 and risk of general anesthesia I weighed the risk and benefits and decided to proceed by using a different guidewire but the same drill.  The drill was rinsed with copious Betadine and sterile solution.  Once adequate reduction was obtained in the length and alignment were restored we placed our guidewire in appropriate position.  This was advanced over reamed with the entry reamer and our nail was placed.  We then placed our guidewire for a lag screw again holding pressure anteriorly to reduce the apex anterior deformity.  Once this is an adequate position we measured over drilled and placed our lag screw again holding reduction.  We then drilled and placed a distal interlock screw through the jig.  Jig was removed final images were obtained indicating appropriate right wrist reduction implant position.  The wound was then thoroughly irrigated sterile saline.  Patient was given IV antibiotics to the contamination in addition to her already scheduled antibiotics.  Sterile dressings were applied.  Patient was awoken taken to PACU stable condition.    Postoperatively I had a long discussion with her family regarding the contaminated set and weighing the risk and benefits especially due to her age under general anesthesia and the timing of the event.  They understand and were in agreement with the plan.    POSTOPERATIVE PLAN: Weightbearing as tolerated right lower extremity weight bearing.  Mobilize with physical and occupational therapies.  DVT prophylaxis with SCDs and Lovenox until mobilizing independently and then can be switched to aspirin for 4 weeks.  The patient will follow up in clinic in 2 weeks to check wounds and remove sutures/staples.      ____________________________________   Jc Escobar M.D.   DD: 10/22/2023  9:22 AM

## 2023-10-22 NOTE — PROGRESS NOTES
[facility] Medicine Daily Progress Note    Date of Service  10/22/2023    Chief Complaint  Kathleen Mendieta is a 89 y.o. female admitted 10/21/2023 with fall    Hospital Course  Anam is a 89 y.o. female with past medical history of hypothyroidism, Alzheimer dementia, who was referred from the facility 10/21/2023 with right intertrochanteric fracture.     Patient sustained fall earlier today.  Subsequent CT C-spine, CT head unremarkable, pelvic x-ray noted with right intertrochanteric fracture.  Labs a lot of facility noted with leukocytosis white count 20,000, glucose 216, creatinine 1.82, lactic acid 6.9, UA negative for infection, COVID-negative, received 1 g Rocephin.     Per patient's son who is at the bedside helping with history taking.  Patient fell down today while using bathroom.  Denies any chest pain, shortness of breath, nausea/vomiting, fever.  Denies any focal weakness/numbness, syncopal episode.  Patient does have a history of underlying heart disease or renal disease.         On arrival to [facility] ED, she remained tachycardic.Repeat labs here noted with leukocytosis white count 16.2, hemoglobin 10.5 glucose 143, elevated BUN/creatinine, lactic acid 4.4, elevated troponin 62, procalcitonin 0.33.  Chest x-ray unremarkable.   Orthopedic Dr. Escobar evaluated and plan is for OR tomorrow for right intertrochanteric femur fracture fixation.  Patient will be admitted to the hospital for further evaluation and management for   right intertrochanteric femur fracture requiring surgical intervention.  Elevated troponin need close telemetry monitoring including serial labs, echocardiogram.  Significant lactacidosis requiring IV fluid, IV antibiotics and further work-up.       Interval Problem Update  10/22: Ms. Mendieta was evaluated and examined on the tele floor. She had surgery this morning by Dr. Escobar. She is now on 10 liters of oxygen and not on oxygen at home. She is at risk of aspiration. NPO until speech sees  her and CXR ordered. Her son and daughter in law are at bedside. They note her history of dementia.   I have discussed this patient's plan of care and discharge plan at IDT rounds today with Case Management, Nursing, Nursing leadership, and other members of the IDT team.    Consultants/Specialty  ortho    Code Status  DNAR/DNI    Disposition  The patient is not medically cleared for discharge to home or a post-acute facility.  Anticipate discharge to: skilled nursing facility    I have placed the appropriate orders for post-discharge needs.    Review of Systems  Review of Systems   Unable to perform ROS: Dementia        Physical Exam  Temp:  [36.2 °C (97.2 °F)-37 °C (98.6 °F)] 36.3 °C (97.4 °F)  Pulse:  [] 93  Resp:  [10-24] 21  BP: (110-168)/(55-73) 137/60  SpO2:  [92 %-100 %] 95 %    Physical Exam  Vitals and nursing note reviewed.   Constitutional:       Appearance: She is ill-appearing. She is not toxic-appearing.   HENT:      Mouth/Throat:      Mouth: Mucous membranes are dry.   Cardiovascular:      Rate and Rhythm: Regular rhythm. Tachycardia present.   Abdominal:      General: There is no distension.      Tenderness: There is no abdominal tenderness.   Musculoskeletal:      Comments: Right thigh incision covered with minimal blood   Neurological:      Comments: Oriented to self and hospital   Psychiatric:      Comments: Pleasant, cooperative with exam         Fluids    Intake/Output Summary (Last 24 hours) at 10/22/2023 0947  Last data filed at 10/22/2023 0815  Gross per 24 hour   Intake 1996.09 ml   Output 75 ml   Net 1921.09 ml       Laboratory  Recent Labs     10/21/23  1427 10/22/23  0201   WBC 16.2* 11.8*   RBC 3.32* 2.81*   HEMOGLOBIN 10.5* 8.6*   HEMATOCRIT 32.4* 27.3*   MCV 97.6 97.2   MCH 31.6 30.6   MCHC 32.4 31.5*   RDW 53.0* 52.7*   PLATELETCT 282 210   MPV 9.5 9.9     Recent Labs     10/21/23  1427 10/22/23  0201   SODIUM 137 139   POTASSIUM 5.0 5.0   CHLORIDE 104 107   CO2 20 24   GLUCOSE  143* 112*   BUN 14 19   CREATININE 1.65* 1.56*   CALCIUM 8.2* 7.5*     Recent Labs     10/21/23  1427   APTT 24.2*   INR 1.15*               Imaging  EC-ECHOCARDIOGRAM COMPLETE W/ CONT   Final Result      US-RENAL   Final Result      1.  Small kidneys with bilateral cortical thinning consistent with medical renal disease and/or age-related atrophy.      DX-CHEST-PORTABLE (1 VIEW)   Final Result         1.  Mild cardiomegaly.      2.  No consolidations identified.      3.  Elevation of right hemidiaphragm.      OUTSIDE IMAGES-CT CERVICAL SPINE   Final Result      OUTSIDE IMAGES-CT HEAD   Final Result      OUTSIDE IMAGES-DX LOWER EXTREMITY, RIGHT   Final Result      OUTSIDE IMAGES-DX PELVIS   Final Result      DX-PORTABLE FLUOROSCOPY < 1 HOUR    (Results Pending)   DX-HIP-UNILATERAL-W/O PELVIS-2/3 VIEWS RIGHT    (Results Pending)   DX-CHEST-LIMITED (1 VIEW)    (Results Pending)        Assessment/Plan  * Closed intertrochanteric fracture of right femur ([facility])  Assessment & Plan  Status post surgical fixation by Dr. Escobar on 10/22  Multimodal pain control  Consider physical and Occupational Therapy  Requiring IV narcotics, monitor for toxicity    Acute respiratory failure with hypoxia ([facility])  Assessment & Plan  Post-operatively her oxygen saturations have dropped and she is requiring 10 liters of oxygen.  NPO until speech path sees her given the risk of post-anesthesia aspiration (family notes that she has trouble swallowing baseline).  Chest xray ordered.    ANETTE (acute kidney injury) ([facility])  Assessment & Plan  Cr 1.65 on admission in the setting of dehydration   Intravenous fluids have been given and Cr 1.56  Monitor urine output   Renal ultrasound unremarkable.      DNR (do not resuscitate)- (present on admission)  Assessment & Plan  As discussed with her son    Alzheimer disease ([facility])  Assessment & Plan  Alzheimer's dementia  Patient is AO x2 which is her baseline  She is able to participate in  communication    Hypothyroidism  Assessment & Plan  Continue levothyroxine    Troponin level elevated  Assessment & Plan  Consistent with Type II STEMI due to demand in the setting of hip fracture.    Leucocytosis  Assessment & Plan  Leukocytosis likely stress-induced  Slight elevated procalcitonin  UA negative for UTI, chest is unremarkable  Stop antibiotics  Check CBC tomorrow      Lactic acid acidosis  Assessment & Plan  Initial lactic acid 6.2 at outside facility, improved to 4.4 with IV fluids  This does not appear to be infectious thus stop antibiotics             VTE prophylaxis:    heparin ppx      I have performed a physical exam and reviewed and updated ROS and Plan today (10/22/2023). In review of yesterday's note (10/21/2023), there are no changes except as documented above.

## 2023-10-22 NOTE — ANESTHESIA TIME REPORT
Anesthesia Start and Stop Event Times     Date Time Event    10/22/2023 0702 Ready for Procedure     0735 Anesthesia Start     0828 Anesthesia Stop        Responsible Staff  10/22/23    Name Role Begin End    Gabino Sky M.D. Anesth 0735 0828        Overtime Reason:  no overtime (within assigned shift)    Comments:

## 2023-10-22 NOTE — PROGRESS NOTES
Patient arrived to unit from ED on zoll, family at bedside. Patient reported pain in right hip with movement. A&Ox3. VSS. On 2L O2. Placed on tele, sinus tachycardia. Patient and family updated on plan of care. Denied other needs at this time.

## 2023-10-22 NOTE — ANESTHESIA POSTPROCEDURE EVALUATION
Patient: Kathleen Mendieta    Procedure Summary     Date: 10/22/23 Room / Location: [place] [address] / SURGERY [facility]    Anesthesia Start: 0735 Anesthesia Stop: 0828    Procedure: INSERTION, INTRAMEDULLARY DORYS, FEMUR, PROXIMAL (Right: Hip) Diagnosis: (right intertrochanteric femur fracture)    Surgeons: Jc Escobar M.D. Responsible Provider: Gabino Sky M.D.    Anesthesia Type: general ASA Status: 2          Final Anesthesia Type: general  Last vitals  BP   Blood Pressure : 107/61    Temp   36.6 °C (97.9 °F)    Pulse   (!) 103   Resp   (!) 23    SpO2   99 %      Anesthesia Post Evaluation    Patient location during evaluation: PACU  Patient participation: complete - patient participated  Level of consciousness: awake and alert  Pain score: 0    Airway patency: patent  Anesthetic complications: no  Cardiovascular status: hemodynamically stable  Respiratory status: acceptable  Hydration status: euvolemic    PONV: none          No notable events documented.     Nurse Pain Score: 0 (NPRS)

## 2023-10-22 NOTE — PROGRESS NOTES
Patient is back to the room from PACU via bed on a ZOLL with PACU RN. Received report from PACU RN, Pt assessed, A&Ox2, disoriented to time, event, postprocedure vitals initiated, vitals stable, pt denies pain. no SOB Noted. Pt is currently on 10L oxymask for 6hrs, per ortho surg recommendation saturation at 100%. Pt updated on plan of care, Call light within reach, personal belongings within reach.  Bed in lowest position.  Tele monitor on and monitor room notified, rhythm is SR 98. Will continue to monitor. Hourly rounding in place.

## 2023-10-22 NOTE — PROGRESS NOTES
Bedside report received from NOC RN. Assumed care of pt. Pt is off the unit to OR for right intertrochanteric femur fracture fixation.

## 2023-10-22 NOTE — THERAPY
"Speech Language Pathology   Clinical Swallow Evaluation     Patient Name: Kathleen Mendieta  AGE:  89 y.o., SEX:  female  Medical Record #: [id number]  Date of Service: 10/22/2023      History of Present Illness  Pt is a 89 y.o. female who presented 10/21/23 with right intertrochanteric fracture. Per RN, pt had an aspiration event this morning with water and was made NPO pending SLP evaluation.      PMHx: hypothyroidism and Alzheimer's dementia. No history of SLP found in this EMR.    CXR, 10/22/23:  1.  No acute cardiopulmonary abnormality identified.  2.  Enlarged cardiac silhouette  3.  Multiple right rib fracture    CXR, 10/21/23:   1.  Mild cardiomegaly.  2.  No consolidations identified.  3.  Elevation of right hemidiaphragm.    General Information:  Vitals  O2 (LPM): 10  O2 Delivery Device: Oxymask  Vitals Comments: O2 for surgery protocols vs poor sats  Level of Consciousness: Alert  Patient Behaviors: Forgetful  Orientation: Self, , General place  Follows Directives: Yes - simple commands only      Prior Living Situation & Level of Function:  Lives with - Patient's Self Care Capacity: Adult Children  Comments: lives in [place]     Communication: WFL, hx of dementia  Swallowing: softens solids at baseline due to dentition, frequent throat clear and intermittent \"choking\" at home, per pt and family at bedside; throat clear began earlier this year (within last 3-6 months) and occurs both with and without PO intake. Family reported MD in ED (at [facility]) thinks it may be related to reflux.       Oral Mechanism Evaluation:  Dentition: Edentulous (dentures are now too big; pt no longer wears)   Facial Symmetry: Equal        Labial Observations: WFL   Lingual Observations: Midline, Xerostomia  Motor Speech: WFL            Laryngeal Function:  Secretion Management: Adequate  Voice Quality: WFL        Cough: Perceptually weak         Subjective  Pt cleared for SLP evaluation by RN. Pt seen with supportive family at " bedside. Pt pleasant and cooperative throughout session. Frequent throat clear began with initial ice chip during PO trials and continued both with and without PO intake for duration of session.      Assessment  Current Method of Nutrition: NPO until cleared by speech pathology  Positioning: Shi's (60-90 degrees)  Bolus Administration: SLP, Patient  O2 (LPM): 10 O2 Delivery Device: Oxymask  Factor(s) Affecting Performance:  (early satiety)  Tracheostomy : No             Swallowing Trials:  Swallowing Trials  Ice: WFL  Liquidised (LQ3): WFL  Pureed (PU4): WFL  Regular (RG7): Impaired      Comments: Pt repositioned to upright and assessed with PO trials of ice chips, thin liquids (by teaspoon and cup edge), applesauce, pudding, and dry amber cracker. Pt demonstrated adequate bolus acceptance with difficulty masticating amber cracker (likely due to dentition), and impaired oral clearance with applesauce and puree requiring additional swallows to clear. Hyolaryngeal elevation present to palpation. Pt intermittently demonstrated multiple swallows/bolus, suggesting possible piecemeal deglutition vs pharyngeal weakness vs other. Pt demonstrated immediate throat clear following initial ice chip trial that continued both with and without PO intake, and concerning for possible airway invasion (vs other).     Discussed completing MBS with family, who has been concerned about her constant throat clear for the last several months, now very concerned after possible aspiration event(s) from earlier today. Pt and family both hopeful to complete this prior to d/c.    Clinical Impressions  Pt presents with oropharyngeal dysphagia characterized by impaired bolus manipulation/mastication and reduced oral clearance, intermittently requiring multiple swallows/bolus, and demonstration of frequent throat clear following the swallow across trials, concerning for possible airway invasion. Recommend soft and bite sized solids and thin  "liquids with medications as tolerated in puree or applesauce. Please ensure 1:1 supervision with meals (family ok) with slow rate, small bites/sips. Hold PO with cough or change in respiratory status.     Recommendations  Diet Consistency: Soft and bite sized solids and thin liquids  Instrumentation: VFSS (MBSS)  Medication: Whole with puree, Crush with applesauce, as appropriate, Crush with pudding/puree, as appropriate, As tolerated  Supervision: 1:1 feeding with constant supervision, Direct supervision during meals, Assist with meal tray set up (ok for family to supervise/assist with PO intake)  Positioning: Fully upright and midline during oral intake, Remain upright for 30-45 minutes after oral intake  Risk Management : Small bites/sips, Alternate bites and sips, Slow rate of intake, No straws  Oral Care: Q6h         SLP Treatment Plan  Treatment Plan: Dysphagia Treatment  SLP Frequency: 3x Per Week  Estimated Duration: Until Therapy Goals Met      Anticipated Discharge Needs  Discharge Recommendations: Recommend post-acute placement for additional speech therapy services prior to discharge home   Therapy Recommendations Upon DC: Dysphagia Training, Patient / Family / Caregiver Education (pending MBS outcomes)        Patient / Family Goals  Patient / Family Goal #1: \"Can I have some water?\"  Short Term Goals  Short Term Goal # 1: Pt will consume SB6/TN0 free from clinical s/sx aspiration or change in respiratory status.  Short Term Goal # 2: Pt will participate in MBSS to objectively observe oropharyngeal swallow function and inform SLP plan of care.      Lacy Leonardo MS,CCC-SLP   "

## 2023-10-22 NOTE — CARE PLAN
The patient is Stable - Low risk of patient condition declining or worsening    Shift Goals  Clinical Goals: monitor vitals, labs, hemodynamic stability  Patient Goals: rest, comfort    Progress made toward(s) clinical / shift goals:    Problem: Knowledge Deficit - Standard  Goal: Patient and family/care givers will demonstrate understanding of plan of care, disease process/condition, diagnostic tests and medications  Outcome: Progressing     Problem: Fall Risk  Goal: Patient will remain free from falls  Outcome: Progressing     Problem: Skin Integrity  Goal: Skin integrity is maintained or improved  Outcome: Progressing     Problem: Psychosocial  Goal: Patient's ability to verbalize feelings about condition will improve  Outcome: Progressing     Problem: Hemodynamics  Goal: Patient's hemodynamics, fluid balance and neurologic status will be stable or improve  Outcome: Progressing     Problem: Respiratory  Goal: Patient will achieve/maintain optimum respiratory ventilation and gas exchange  Outcome: Progressing     Problem: Pain - Standard  Goal: Alleviation of pain or a reduction in pain to the patient’s comfort goal  Outcome: Progressing       Patient is not progressing towards the following goals:

## 2023-10-22 NOTE — PROGRESS NOTES
No UOP since start of shift. Pt is poor historian and cannot remember the last time she voided. Does voice urge to go and desire to get up and go to bathroom, then says she doesn't need to go anymore. Writer reminded pt she has a purewick in place and that she cannot ambulate d/t her fx. 0000 bladder scan 201, recheck at 0342 231. New order in place to straight cath if > 400.    0630: bladder scan 301.

## 2023-10-23 PROBLEM — D62 ACUTE BLOOD LOSS ANEMIA: Status: ACTIVE | Noted: 2023-01-01

## 2023-10-23 PROBLEM — S22.41XA CLOSED FRACTURE OF MULTIPLE RIBS OF RIGHT SIDE: Status: ACTIVE | Noted: 2023-01-01

## 2023-10-23 NOTE — PROGRESS NOTES
NOC CROSSCOVER    RN reported hemoglobin 6.5  I have ordered 1 unit RBC's.    CHRISTINA Wright

## 2023-10-23 NOTE — PROGRESS NOTES
[facility] Medicine Daily Progress Note    Date of Service  10/23/2023    Chief Complaint  Kathleen Mendieta is a 89 y.o. female admitted 10/21/2023 with fall    Hospital Course  Anam is a 89 y.o. female with past medical history of hypothyroidism, Alzheimer dementia, who was referred from the facility 10/21/2023 with right intertrochanteric fracture.     Patient sustained fall earlier today.  Subsequent CT C-spine, CT head unremarkable, pelvic x-ray noted with right intertrochanteric fracture.  Labs a lot of facility noted with leukocytosis white count 20,000, glucose 216, creatinine 1.82, lactic acid 6.9, UA negative for infection, COVID-negative, received 1 g Rocephin.     Per patient's son who is at the bedside helping with history taking.  Patient fell down today while using bathroom.  Denies any chest pain, shortness of breath, nausea/vomiting, fever.  Denies any focal weakness/numbness, syncopal episode.  Patient does have a history of underlying heart disease or renal disease.         On arrival to [facility] ED, she remained tachycardic.Repeat labs here noted with leukocytosis white count 16.2, hemoglobin 10.5 glucose 143, elevated BUN/creatinine, lactic acid 4.4, elevated troponin 62, procalcitonin 0.33.  Chest x-ray unremarkable.   Orthopedic Dr. Escobar evaluated and plan is for OR tomorrow for right intertrochanteric femur fracture fixation.  Patient will be admitted to the hospital for further evaluation and management for   right intertrochanteric femur fracture requiring surgical intervention.  Elevated troponin need close telemetry monitoring including serial labs, echocardiogram.  Significant lactacidosis requiring IV fluid, IV antibiotics and further work-up.       Interval Problem Update  Patient was seen and examined at bedside.  No acute events overnight. Patient is resting comfortably in bed and in no acute distress. Family updated at bedside.    S/p OR 10/22 Right femur cephalomedullary nail  PRBC x1  today    Consultants/Specialty  ortho    Code Status  DNAR/DNI    Disposition  The patient is not medically cleared for discharge to home or a post-acute facility.  Anticipate discharge to: skilled nursing facility    I have placed the appropriate orders for post-discharge needs.    Review of Systems  Review of Systems   Unable to perform ROS: Dementia   HENT:  Positive for hearing loss.         Physical Exam  Temp:  [36.1 °C (97 °F)-37 °C (98.6 °F)] 36.7 °C (98.1 °F)  Pulse:  [] 103  Resp:  [15-24] 18  BP: ()/(51-82) 114/51  SpO2:  [96 %-100 %] 96 %    Physical Exam  Vitals and nursing note reviewed.   Constitutional:       General: She is not in acute distress.     Appearance: She is not toxic-appearing.      Comments: Chronically ill appearing  Hard of hearing   HENT:      Right Ear: External ear normal.      Left Ear: External ear normal.      Nose: No congestion.      Mouth/Throat:      Mouth: Mucous membranes are dry.      Pharynx: No oropharyngeal exudate.   Eyes:      General: No scleral icterus.  Cardiovascular:      Rate and Rhythm: Regular rhythm. Tachycardia present.   Pulmonary:      Breath sounds: No wheezing.   Abdominal:      Tenderness: There is no abdominal tenderness. There is no guarding or rebound.   Musculoskeletal:         General: Swelling present. No deformity.      Comments: Post surgical changes to right proximal hip  Right lower extremity neurovascularly in tact   Skin:     Coloration: Skin is not jaundiced.      Findings: No bruising.   Neurological:      General: No focal deficit present.      Mental Status: She is alert.      Motor: No weakness.      Comments: Oriented to self and hospital   Psychiatric:      Comments: Pleasant, cooperative with exam         Fluids    Intake/Output Summary (Last 24 hours) at 10/23/2023 1158  Last data filed at 10/23/2023 0942  Gross per 24 hour   Intake 631.67 ml   Output 60 ml   Net 571.67 ml       Laboratory  Recent Labs      10/21/23  1427 10/22/23  0201 10/23/23  0049   WBC 16.2* 11.8* 12.5*   RBC 3.32* 2.81* 2.05*   HEMOGLOBIN 10.5* 8.6* 6.5*   HEMATOCRIT 32.4* 27.3* 20.1*   MCV 97.6 97.2 98.0*   MCH 31.6 30.6 31.7   MCHC 32.4 31.5* 32.3   RDW 53.0* 52.7* 54.8*   PLATELETCT 282 210 181   MPV 9.5 9.9 10.2     Recent Labs     10/21/23  1427 10/22/23  0201 10/23/23  0049   SODIUM 137 139 136   POTASSIUM 5.0 5.0 5.1   CHLORIDE 104 107 104   CO2 20 24 21   GLUCOSE 143* 112* 123*   BUN 14 19 31*   CREATININE 1.65* 1.56* 1.84*   CALCIUM 8.2* 7.5* 7.6*     Recent Labs     10/21/23  1427   APTT 24.2*   INR 1.15*               Imaging  DX-CHEST-LIMITED (1 VIEW)   Final Result      1.  No acute cardiopulmonary abnormality identified.      2.  Enlarged cardiac silhouette      3.  Multiple right rib fracture         DX-PORTABLE FLUOROSCOPY < 1 HOUR   Final Result      Portable fluoroscopy utilized for 44 seconds.         INTERPRETING LOCATION: [address] [place] [address], [id number]      DX-HIP-UNILATERAL-W/O PELVIS-2/3 VIEWS RIGHT   Final Result      Digitized intraoperative radiograph is submitted for review. This examination is not for diagnostic purpose but for guidance during a surgical procedure. Please see the patient's chart for full procedural details.         INTERPRETING LOCATION: [address] [place] [address], [id number]      EC-ECHOCARDIOGRAM COMPLETE W/ CONT   Final Result      US-RENAL   Final Result      1.  Small kidneys with bilateral cortical thinning consistent with medical renal disease and/or age-related atrophy.      DX-CHEST-PORTABLE (1 VIEW)   Final Result         1.  Mild cardiomegaly.      2.  No consolidations identified.      3.  Elevation of right hemidiaphragm.      OUTSIDE IMAGES-CT CERVICAL SPINE   Final Result      OUTSIDE IMAGES-CT HEAD   Final Result      OUTSIDE IMAGES-DX LOWER EXTREMITY, RIGHT   Final Result      OUTSIDE IMAGES-DX PELVIS   Final Result      DX-ESOPHAGUS - [id number]    (Results Pending)        Assessment/Plan  *  Closed intertrochanteric fracture of right femur ([facility])  Assessment & Plan  Status post surgical fixation by Dr. Escobar on 10/22  Multimodal pain control  Consider physical and Occupational Therapy  Requiring IV narcotics, monitor for toxicity    Acute blood loss anemia  Assessment & Plan  Hb 10.5 --> 6.5  In setting of femoral fracture  PRBC x1 today  monitor    Closed fracture of multiple ribs of right side  Assessment & Plan  As identified on CXR  Lidocaine patch  Incentive spirometry    Acute respiratory failure with hypoxia ([facility])  Assessment & Plan  Post-operatively her oxygen saturations have dropped and she is requiring 10 liters of oxygen.  NPO until speech path sees her given the risk of post-anesthesia aspiration (family notes that she has trouble swallowing baseline).  CXR reviewed  Now on 1L supplemental oxygen    Leucocytosis  Assessment & Plan  Leukocytosis likely stress-induced  Slight elevated procalcitonin  UA negative for UTI, chest is unremarkable  Stop antibiotics  Check CBC tomorrow      ANETTE (acute kidney injury) ([facility])  Assessment & Plan  Likely sequelae of urinary retention with bladder scan consistently >300  Renal ultrasound unremarkable.  Cr 1.65 --> 1.84    DNR (do not resuscitate)- (present on admission)  Assessment & Plan  As discussed with her son    Alzheimer disease ([facility])  Assessment & Plan  Alzheimer's dementia  Patient is AO x2 which is her baseline  She is able to participate in communication    Hypothyroidism  Assessment & Plan  Continue levothyroxine    Troponin level elevated  Assessment & Plan  Consistent with Type II STEMI due to demand in the setting of hip fracture.    Lactic acid acidosis  Assessment & Plan  Initial lactic acid 6.2 at outside facility, improved to 4.4 with IV fluids  This does not appear to be infectious thus stop antibiotics             VTE prophylaxis:   SCDs/TEDs      I have performed a physical exam and reviewed and updated ROS and Plan today  (10/23/2023). In review of yesterday's note (10/22/2023), there are no changes except as documented above.    Greater than 51 minutes spent prepping to see patient (e.g. review of tests) obtaining and/or reviewing separately obtained history. Performing a medically appropriate examination and/ evaluation.  Counseling and educating the patient/family/caregiver.  Ordering medications, tests, or procedures.  Referring and communicating with other health care professionals.  Documenting clinical information in EPIC.  Independently interpreting results and communicating results to patient/family/caregiver.  Care coordination.

## 2023-10-23 NOTE — CARE PLAN
The patient is Stable - Low risk of patient condition declining or worsening    Shift Goals  Clinical Goals: monitor surgical sites, monitor HR, hemodynamic stability, urine sample  Patient Goals: manage pain, rest    Progress made toward(s) clinical / shift goals:      Problem: Fall Risk  Goal: Patient will remain free from falls  Outcome: Progressing     Problem: Skin Integrity  Goal: Skin integrity is maintained or improved  Outcome: Progressing     Problem: Discharge Barriers/Planning  Goal: Patient's continuum of care needs are met  Outcome: Progressing     Problem: Risk for Aspiration  Goal: Patient's risk for aspiration will be absent or decrease  Outcome: Progressing     Problem: Pain - Standard  Goal: Alleviation of pain or a reduction in pain to the patient’s comfort goal  Outcome: Progressing       Patient is not progressing towards the following goals:       Qbrexza Counseling:  I discussed with the patient the risks of Qbrexza including but not limited to headache, mydriasis, blurred vision, dry eyes, nasal dryness, dry mouth, dry throat, dry skin, urinary hesitation, and constipation.  Local skin reactions including erythema, burning, stinging, and itching can also occur.

## 2023-10-23 NOTE — DISCHARGE PLANNING
0941  Received Choice form at 0938  Agency/Facility Name: [facility]   Referral sent per Choice form @ 0941     1055  Agency/Facility Name: [facility]   Outcome: DPA left VM regarding referral with request of call back.     1504  Agency/Facility Name: [facility]   Spoke To: Margo   Outcome: DPA called regarding referral, per [facility] referral received and have pt accepted. [facility] suggesting to call tomorrow morning to verify what time Pt can be transported. [facility] suggesting to call # [phone] for updates tomorrow.

## 2023-10-23 NOTE — THERAPY
Occupational Therapy Contact Note    Patient Name: Kathleen Mendieta  Age:  89 y.o., Sex:  female  Medical Record #: [id number]  Today's Date: 10/23/2023    OT consult rec'd. Pt getting blood transfusion this AM; will hold OT eval and will re-attempt as appropriate/able.

## 2023-10-23 NOTE — DISCHARGE PLANNING
Care Transition Team Assessment    Information Source  Orientation Level: Oriented to place, Oriented to situation, Oriented to person, Disoriented to time  Who is responsible for making decisions for patient? : Patient    Readmission Evaluation  Is this a readmission?: No    Elopement Risk  Legal Hold: No  Ambulatory or Self Mobile in Wheelchair: No-Not an Elopement Risk  Elopement Risk: Not at Risk for Elopement    Interdisciplinary Discharge Planning  Lives with - Patient's Self Care Capacity: Adult Children  Patient or legal guardian wants to designate a caregiver: No  Support Systems: Children, Family Member(s)  Housing / Facility: Mobile Home  Prior Services: Intermittent Physical Support for ADL Per Family  Durable Medical Equipment: Not Applicable    Discharge Preparedness  What is your plan after discharge?: Skilled nursing facility  What are your discharge supports?: Child, Other (comment) (grandchildren)  Prior Functional Level: Ambulatory, Needs Assist with Activities of Daily Living, Needs Assist with Medication Management    Functional Assesment  Prior Functional Level: Ambulatory, Needs Assist with Activities of Daily Living, Needs Assist with Medication Management    Finances  Financial Barriers to Discharge: No  Prescription Coverage: Yes    Vision / Hearing Impairment  Vision Impairment : Yes  Right Eye Vision: Wears Glasses  Left Eye Vision: Wears Glasses  Hearing Impairment : Yes  Hearing Impairment: Both Ears, Hearing Device Not Available  Does Pt Need Special Equipment for the Hearing Impaired?: No    Advance Directive  Advance Directive?:  (DNAR/DNI)    Domestic Abuse  Have you ever been the victim of abuse or violence?: No  Physical Abuse or Sexual Abuse: No  Verbal Abuse or Emotional Abuse: No  Possible Abuse/Neglect Reported to:: Not Applicable    Psychological Assessment  History of Substance Abuse: None  History of Psychiatric Problems: No  Non-compliant with Treatment: No  Newly  Diagnosed Illness: Yes    Discharge Risks or Barriers  Discharge risks or barriers?: Complex medical needs  Patient risk factors: Complex medical needs, Vulnerable adult    Anticipated Discharge Information  Discharge Disposition: D/T to SNF with Medicare cert in anticipation of skilled care (03)  Discharge Address: [address] [place] [address]  Discharge Contact Phone Number: [phone]    Case Management Discharge Planning    Admission Date: 10/21/2023  GMLOS: 6.5  ALOS: 2    6-Clicks ADL Score: 10  6-Clicks Mobility Score: 13  PT and/or OT Eval ordered: Yes  Post-acute Referrals Ordered: Yes  Post-acute Choice Obtained: Yes  Has referral(s) been sent to post-acute provider:  Yes      Anticipated Discharge Dispo: Discharge Disposition: D/T to SNF with Medicare cert in anticipation of skilled care (03)  Discharge Address: [address] [place] [address]  Discharge Contact Phone Number: [phone]    DME Needed: Unable to determine at this time.    Action(s) Taken: Updated Provider/Nurse on Discharge Plan, Choice obtained, Referral(s) sent, Acceptance Received, Completed PASSR/LOC, and OTHER: RN CM attended IDT rounds and collaborated with the team regarding discharge planning needs and barriers. Choice form faxed to [facility] to upload to the patient's chart and follow-up on any acceptances.    PASRR: [id number]    Escalations Completed: Provider and Bedside RN    Medically Clear: No    Next Steps: Follow-up with the Attending and Bedside RN for any issues/changes and update the discharge plan accordingly.    Barriers to Discharge: Medical clearance, Pending Placement, and Pending Procedures    Is the patient up for discharge tomorrow: Yes    Is transport arranged for discharge disposition: No

## 2023-10-23 NOTE — PROGRESS NOTES
Report received from nightshift RN, assumed care of pt. Pt A&Ox3, in no apparent distress, no complaints of pain. Tele box on, rhythm verified. Plan of care discussed with pt, labs and chart reviewed. Bed locked and in lowest position, call light and belongings within reach. No further needs at this time, will continue to monitor.

## 2023-10-23 NOTE — THERAPY
Physical Therapy   Initial Evaluation     Patient Name: Kathleen Mendieta  Age:  89 y.o., Sex:  female  Medical Record #: [id number]  Today's Date: 10/23/2023     Precautions  Precautions: Fall Risk;Swallow Precautions  Comments: WBAT R LE    Assessment  Patient is 89 y.o. female with GLF, now s/p Right femur cephalomedullary nail.  Additional factors influencing patient status / progress: today, pt is very motivated, pleasant, but most limited by pain with OOB that she rated as 8/10 despite Tylenol earlier. Nsg updated and will discuss pain medication options. Pt follows cues well for OOB and use of FWW for ambulation. Pt does show low BP when up as well, but asymptomatic. Pt has good support from family at home, PT to follow to progress activity as pain level improves.         Plan    Physical Therapy Initial Treatment Plan   Treatment Plan : Bed Mobility, Gait Training, Neuro Re-Education / Balance, Therapeutic Activities, Therapeutic Exercise  Treatment Frequency: 4 Times per Week  Duration: Until Therapy Goals Met    DC Equipment Recommendations: Unable to determine at this time  Discharge Recommendations: Recommend post-acute placement for additional physical therapy services prior to discharge home     Objective   10/23/23 1312   Charge Group   PT Evaluation PT Evaluation Mod   Total Time Spent   PT Total Time Yes   PT Evaluation Time Spent (Mins) 30   PT Total Time Spent (Calculated) 30   Initial Contact Note    Initial Contact Note Order Received and Verified, Physical Therapy Evaluation in Progress with Full Report to Follow.   Precautions   Precautions Fall Risk;Swallow Precautions   Comments WBAT R LE   Vitals   Pulse (!) 112   Blood Pressure  96/49  (standing. no c/o dizzy, but c/o pain)   Pulse Oximetry 95 %   O2 Delivery Device None - Room Air   Pain 0 - 10 Group   Therapist Pain Assessment During Activity;Nurse Notified;8  (pt had tylenol earlier, ed with pt re pain med discussion with nsg to allow better  "pain control, nsg updated)   Prior Living Situation   Prior Services Intermittent Physical Support for ADL Per Family   Housing / Facility Mobile Home   Steps Into Home 3  (family is planning a ramp)   Steps In Home 0   Equipment Owned   (3 ww, doesn't use)   Lives with - Patient's Self Care Capacity Adult Children  (Son and DIL, son works, DIL is home with pt 24/7, assists with all as needed.)   Prior Level of Functional Mobility   Bed Mobility Independent   Transfer Status Independent   Ambulation Independent   Ambulation Distance household   Assistive Devices Used None   Stairs Independent   History of Falls   History of Falls Yes   Date of Last Fall   (family reports a few falls over last few years besides this one, one over O2 tubing,others when \"she gets in a rush\")   Cognition    Cognition / Consciousness X   Speech/ Communication Hard of Hearing  (L ear best)   Level of Consciousness Alert   Comments pt is following cues well for ambulation   Active ROM Lower Body    Active ROM Lower Body  X   Comments R LE functional ROM, but avoids full motion due to pain, L LE wfl.   Strength Lower Body   Lower Body Strength  X   Comments R LE limited by post op pain, L LE wfl.   Balance Assessment   Sitting Balance (Static) Fair   Sitting Balance (Dynamic) Fair -   Standing Balance (Static) Fair -   Standing Balance (Dynamic) Fair -   Weight Shift Sitting Fair   Weight Shift Standing Fair   Comments withFWW   Bed Mobility    Supine to Sit Moderate Assist   Sit to Supine Moderate Assist   Scooting Moderate Assist   Rolling Moderate Assist to Lt.   Gait Analysis   Gait Level Of Assist Minimal Assist   Assistive Device Front Wheel Walker   # of Times Distance was Traveled 1   Deviation Antalgic  (cues for safety with stand to sit, sit to stand.)   # of Stairs Climbed 0   Weight Bearing Status WBAT R LE   Comments fatigue and pain limit pt's walking endurance.   Functional Mobility   Sit to Stand Moderate Assist   Bed, " Chair, Wheelchair Transfer Moderate Assist   Transfer Method Stand Pivot   How much difficulty does the patient currently have...   Turning over in bed (including adjusting bedclothes, sheets and blankets)? 2   Sitting down on and standing up from a chair with arms (e.g., wheelchair, bedside commode, etc.) 2   Moving from lying on back to sitting on the side of the bed? 2   How much help from another person does the patient currently need...   Moving to and from a bed to a chair (including a wheelchair)? 2   Need to walk in a hospital room? 3   Climbing 3-5 steps with a railing? 2   6 clicks Mobility Score 13   Activity Tolerance   Sitting in Chair 5  (asked for BTB due to pain)   Short Term Goals    Short Term Goal # 1 Pt will perform bed mobility with supervision in 6 visits   Short Term Goal # 2 Pt will transfer with supervision in 6 visits to improve functional indep.   Short Term Goal # 3 Pt will ambulate x 125 feet using FWW with supervision in 6 visits to improve functional indep.   Education Group   Education Provided Weight Bearing Status   Role of Physical Therapist Patient Response Patient;Acceptance;Explanation;Action Demonstration   Weight Bearing Status Patient Response Patient;Family;Acceptance;Explanation;Action Demonstration   Physical Therapy Initial Treatment Plan    Treatment Plan  Bed Mobility;Gait Training;Neuro Re-Education / Balance;Therapeutic Activities;Therapeutic Exercise   Treatment Frequency 4 Times per Week   Duration Until Therapy Goals Met   Problem List    Problems Pain;Impaired Bed Mobility;Impaired Transfers;Impaired Ambulation;Impaired Balance;Decreased Activity Tolerance   Anticipated Discharge Equipment and Recommendations   DC Equipment Recommendations Unable to determine at this time   Discharge Recommendations Recommend post-acute placement for additional physical therapy services prior to discharge home   Interdisciplinary Plan of Care Collaboration   IDT Collaboration with   Nursing;Family / Caregiver   Patient Position at End of Therapy In Bed;Call Light within Reach;Tray Table within Reach;Phone within Reach;Family / Friend in Room   Collaboration Comments Speech in after PT, nsg updated.   Session Information   Date / Session Number  10/24-1 (1/4, 10/29)   .

## 2023-10-23 NOTE — THERAPY
Speech Language Pathology   Videofluoroscopic Swallow Study Evaluation     Patient Name: Kathleen Mendieta  AGE:  89 y.o., SEX:  female  Medical Record #: [id number]  Date of Service: 10/23/2023      History of Present Illness  y.o. female who presented 10/21/23 with right intertrochanteric fracture. Per RN, pt had a suspected aspiration event this morning with water and was made NPO pending SLP evaluation.      Pertinent Information  Current Method of Nutrition: Oral diet (SB/TN)  Patient Behaviors: Forgetful  Dentition: Edentulous  Secretion Management: Adequate  Feeding Tube: None  Factor(s) Affecting Performance: Impaired endurance      Discussed the risks, benefits, and alternatives of the VFSS procedure. Patient/family acknowledged and agreed to proceed.      Assessment  Videofluoroscopic Swallow Study was conducted in the Lateral projection(s) to evaluate oropharyngeal swallow function. A radiology tech was present to assist with the procedure.      Positioning: Seated upright in fluoroscopy chair  Anatomic View: Possible CP bar; narrowing in the proximal esophagus  Bolus Administration: SLP  PO Barium Contrast Trials: Varibar thin, Varibar nectar (mildly thick), Liquidised mixed with Varibar powder, Varibar pudding, Soft & Bite sized coated with Varibar powder, Regular solid coated with Varibar pudding, Mixed consistency with Varibar powder      Consistency PAS Score Timing Residue Comments   Thin Liquid 3 During swallow, Post swallow Vallecular Residue: Mild (5%-25%)  Pyriform Sinus Residue: Mild (5%-25%) Cup - PAS 1 x2, PAS 2 x2, PAS 3 x3   Mildly Thick 1 N/A Vallecular Residue: Mild (5%-25%)  Pyriform Sinus Residue: Mild (5%-25%)    Liquidised 1 N/A Vallecular Residue: Severe (>50%)  Pyriform Sinus Residue: Moderate (25%-50%)    Pudding 1 N/A Vallecular Residue: Moderate (25%-50%)  Pyriform Sinus Residue: Mild (5%-25%)    Soft & Bite Sized 1 N/A Vallecular Residue: Moderate (25%-50%)  Pyriform Sinus Residue:  Trace (1%-5%)    Regular Solid 1 N/A Vallecular Residue: Moderate (25%-50%)  Pyriform Sinus Residue: Mild (5%-25%)    Mixed 1 N/A Vallecular Residue: Moderate (25%-50%)  Pyriform Sinus Residue: Trace (1%-5%)      Penetration-Aspiration Scale (PAS)  1     No contrast enters airway  2     Contrast enters the airway, remains above the vocal folds, and is ejected from the airway (not seen in the airway at the end of the swallow).  3     Contrast enters the airway, remains above the vocal folds, and is not ejected from the airway (is seen in the airway after the swallow).  4     Contrast enters the airway, contacts the vocal folds, and is ejected from the airway.  5     Contrast enters the airway, contacts the vocal folds, and is not ejected from the airway  6     Contrast enters the airway, crosses the plane of the vocal folds, and is ejected from the airway.  7     Contrast enters the airway, crosses the plane of the vocal folds, and is not ejected from the airway despite effort.  8     Contrast enters the airway, crosses the plane of the vocal folds, is not ejected from the airway and there is no response to aspiration.       Oral phase:  Timely mastication and AP transit despite edentulous status for soft solids. Diffuse lingual residue with cracker that was cleared with liquid wash.      Pharyngeal phase:  Pharyngeal phase characterized by impairments in BOT retraction, pharyngeal shortening, and LVC which resulted in the following:  - Shallow penetration with TN0 during the swallow that inconsistently cleared with completion of the pharyngeal swallow but would consistently be cleared with spontaneous throat clear. Considering intact sensation, this can be functional for age.   - Moderate to severe vallecular residue with solid trials that did not clear spontaneously  - Mild pyriform sinus residue with TN0, MT2, PU4, and RG7, as well as moderate pyriform sinus residue with LQ3, that did not clear spontaneously  -  Trace PPW residue throughout      Esophageal Phase:  AP esophageal screen was not completed given pt kyphosis and limited independence with mobility. However, consistent retention and retrograde flow noted in lateral view of the proximal esophagus with concern for esophageal anatomical changes. Retention did not completely pass from view during study. No retrograde flow noted above the level of the UES; cannot reasonably rule out.       Compensatory Strategies:  Throat clear - EFFECTIVE to clear penetrate  Liquid wash - EFFECTIVE to reduce residue  Cleansing swallows - EFFECTIVE to reduce residue      Severity Rating:   SERVANDO: Mild-Moderate      Clinical Impressions  The pt presents with a mild-moderate oropharyngeal dysphagia, likely chronic related to esophageal findings and age related changes. Swallow safety is impaired; pharyngeal efficiency is impaired. Would recommend SB/TN diet at this time with use of strict spv with PO and reflux precautions. Pt may benefit from GI consult/work-up. Pt appears to be a good candidate for ongoing behavorial and exercise-based swallow rehabilitation. Dysphagia outcomes can be maximized with use of mobility as pt is able and frequent, thorough oral care.         Recommendations  Soft and bite sized solids and thin liquids  Medication: Crush with pudding/puree, as appropriate, Crush with applesauce, as appropriate  Supervision: 1:1 feeding with constant supervision, Careful 1:1 hand feeding (ok for family to supervise/assist with PO intake)  Positioning: Fully upright and midline during oral intake, Remain upright for 30-45 minutes after oral intake, Meals sitting upright in a chair, as tolerated  Strategies: Small bites/sips, Slow rate of intake, Alternate bites and sips, Multiple swallows (2) per bite/sips, Reduce environmental distractions  Oral Care: BID  Additional Instrumentation:  Consider GI study  Consult Referral(s): Gastroenterologist      SLP Treatment Plan  Treatment  "Plan: Dysphagia Treatment, Patient/Family/Caregiver Training  SLP Frequency: 3x Per Week  Estimated Duration: Until Therapy Goals Met      Anticipated Discharge Needs  Discharge Recommendations: Recommend post-acute placement for additional speech therapy services prior to discharge home   Therapy Recommendations Upon DC: Dysphagia Training, Patient / Family / Caregiver Education, Community Re-Integration (would also recommend GI work-up)        Patient / Family Goals  Patient / Family Goal #1: \"Can I have some water?\"  Goal #1 Outcome: Progressing as expected  Short Term Goal # 1: Pt will consume SB6/TN0 free from clinical s/sx aspiration or change in respiratory status.  Goal Outcome # 1: Progressing as expected  Short Term Goal # 2: Pt will participate in MBSS to objectively observe oropharyngeal swallow function and inform SLP plan of care.  Goal Outcome # 2 : Goal met, new goal aded  Short Term Goal # 2 B : Pt will complete exercises targeting BOT retraction, pharyngeal shortening, and LVC x30 in a session.      Fariha Cohen, SLP   "

## 2023-10-23 NOTE — PROGRESS NOTES
Consent for blood transfusion obtained by henna Mendieta, called [phone] at 0356 on 10/23.   Witnessed by Monique Singh RN and Maurizio Escobar RN.

## 2023-10-23 NOTE — PROGRESS NOTES
"      Orthopaedic Progress Note    Interval changes:  Patient doing well post op  Right hip dressings are CDI  Cleared for DC to SNF by ortho pending medicine clearance    ROS - Patient denies any new issues.  Pain well controlled.    /59   Pulse (!) 106   Temp 36.7 °C (98.1 °F) (Temporal)   Resp 18   Ht 1.575 m (5' 2\")   Wt 57.8 kg (127 lb 6.8 oz)   SpO2 99%     Patient seen and examined  No acute distress  Breathing non labored  RRR  RLE dressings CDI, DNVI, moves all toes, cap refill <2 sec.      Recent Labs     10/21/23  1427 10/22/23  0201 10/23/23  0049 10/23/23  1241   WBC 16.2* 11.8* 12.5*  --    RBC 3.32* 2.81* 2.05*  --    HEMOGLOBIN 10.5* 8.6* 6.5* 8.2*   HEMATOCRIT 32.4* 27.3* 20.1* 24.7*   MCV 97.6 97.2 98.0*  --    MCH 31.6 30.6 31.7  --    MCHC 32.4 31.5* 32.3  --    RDW 53.0* 52.7* 54.8*  --    PLATELETCT 282 210 181  --    MPV 9.5 9.9 10.2  --        Active Hospital Problems    Diagnosis     Closed fracture of multiple ribs of right side [S22.41XA]     Acute blood loss anemia [D62]     Acute respiratory failure with hypoxia ([facility]) [J96.01]     DNR (do not resuscitate) [Z66]     Closed intertrochanteric fracture of right femur ([facility]) [S72.141A]     ANETTE (acute kidney injury) ([facility]) [N17.9]     Lactic acid acidosis [E87.20]     Leucocytosis [D72.829]     Troponin level elevated [R79.89]     Elevated troponin [R79.89]     Hypothyroidism [E03.9]     Alzheimer disease ([facility]) [G30.9, F02.80]        Assessment/Plan:  Patient doing well post op  Right hip dressings are CDI  Cleared for DC to SNF by ortho pending medicine clearance  POD#1 S/P Right femur cephalomedullary nail  Wt bearing status - WBAT  Wound care/Drains - Dressings to be changed every other day by nursing. Or PRN for saturation starting POD#2  Future Procedures - none planned   Sutures/Staples out- 14-21 days post operatively. Removal will completed by ortho mid levels only.  PT/OT-initiated  Antibiotics: Perioperative completed  DVT " Prophylaxis- TEDS/SCDs/Foot pumps  Britton-not needed per ortho  Case Coordination for Discharge Planning - Disposition per therapy recs.

## 2023-10-23 NOTE — THERAPY
"Speech Language Pathology   Daily Treatment     Patient Name: Kathleen Mendieta  AGE:  89 y.o., SEX:  female  Medical Record #: [id number]  Date of Service: 10/23/2023      Precautions:  Precautions: Swallow Precautions, Fall risk, reflux precautions, WBAT R LE       Subjective  Pt agreeable and cooperative with SLP tx tasks. \"Why does my swallow do that?\"      Assessment  Pt seen for dysphagia management. Repositioned to upright. Review results of MBSS from earlier this date; pt will likely need reinforcement. TN0 water used to facilitate swallow exercises targeting deficits noted on MBSS. Pt complete effortful swallow x15 (good effort, fair accuracy) and Rosalia x2/7 attempts (excellent effort, inconsistent accuracy). During small sips of TN0 water used for exercises, pt did have frequent throat clear (consistent with MBSS) and single cough response (worse bedside performance than MBSS). Query positioning; would recommend UIC for meals if tolerated.       Clinical Impressions  Per MBSS, pt demonstrates a mild-moderate oropharyngeal dysphagia with a component of esophageal dysphagia. Would recommend SB/TN diet at this time with use of strict spv with PO and reflux precautions. Pt may benefit from GI consult/work-up. Pt appears to be a good candidate for ongoing behavorial and exercise-based swallow rehabilitation. Dysphagia outcomes can be maximized with use of mobility as pt is able and frequent, thorough oral care.       Recommendations  Soft and bite sized solids and thin liquids  Instrumentation: Instrumental swallow study pending clinical progress  Medication: Crush with applesauce, as appropriate, Crush with pudding/puree, as appropriate  Supervision: 1:1 feeding with constant supervision, Careful 1:1 hand feeding (ok for family to supervise/assist with PO intake)  Positioning: Fully upright and midline during oral intake, Remain upright for 30-45 minutes after oral intake, Meals sitting upright in a chair, as " "tolerated  Risk Management : Small bites/sips, Slow rate of intake, Alternate bites and sips, Periodic throat clear okay, Swallow x2 per bite  Oral Care: BID  Consult Referral(s): Gastroenterologist      SLP Treatment Plan  Treatment Plan: Dysphagia Treatment, Patient/Family/Caregiver Training  SLP Frequency: 4x Per Week  Estimated Duration: Until Therapy Goals Met      Anticipated Discharge Needs  Discharge Recommendations: Recommend post-acute placement for additional speech therapy services prior to discharge home  Therapy Recommendations Upon DC: Dysphagia Training, Community Re-Integration, Patient / Family / Caregiver Education (would also recommend GI work-up)      Patient / Family Goals  Patient / Family Goal #1: \"Can I have some water?\"  Goal #1 Outcome: Progressing as expected  Short Term Goals  Short Term Goal # 1: Pt will consume SB6/TN0 free from clinical s/sx aspiration or change in respiratory status.  Goal Outcome # 1: Progressing as expected  Short Term Goal # 2: Pt will participate in MBSS to objectively observe oropharyngeal swallow function and inform SLP plan of care.  Goal Outcome # 2 : Goal met, new goal aded  Short Term Goal # 2 B : Pt will complete exercises targeting BOT retraction, pharyngeal shortening, and LVC x30 in a session.  Goal Outcome  # 2 B: Progressing as expected      Fariha Cohen, SLP  "

## 2023-10-23 NOTE — DISCHARGE PLANNING
[facility] Transitional Care Coordination    Referral from: Dr. FRANCIS Escobar    Insurance Provider on Facesheet: MCR/CATHLEEN FFS    Potential Rehab Diagnosis: Right intertrochanteric femur fracture    Chart review indicates patient may have on going medical management and may have therapy needs to possibly meet inpatient rehab facility criteria with the goal of returning to community.    D/C support will need to be verified: Daughter    Physiatry consultation pended per protocol. TX pending.     Thank you for the referral.

## 2023-10-23 NOTE — PROGRESS NOTES
Dr Rojas notified, patient's heart rate went up 140s and sustaining, /59. Pt is not symptomatic. STAT EKG showed afib.   Per Dr Camarena, give pt 2.5mg IV metoprolo once, if in 5min her HR still above 120, give another 2.5mg iv metoprolol.     1900 Pt HR went down to low 100s ST before medication was approved by the pharmacy.

## 2023-10-23 NOTE — PROGRESS NOTES
2230: pt without UOP since 1600. Bladder scan showed 351.   2235: spontaneous void via purewick, 60mL in cannister.  2240: .   2335: UA, cx, protein/potassium/sodium collected and sent.  0430: bladder scan 340.

## 2023-10-24 NOTE — DISCHARGE PLANNING
Agency/Facility Name: [facility]   Spoke To: Roma   Outcome: DPA called to confirm Pt can be accepted to facility today. Per Roma Pt can be accepted and requesting CM RN to set up transport for anytime. DPA to call to confirm transport time. Rick also requesting DC summary to faxed when available to fax #: [phone].     1108  Agency/Facility Name: [facility]   Spoke To: Roma   Outcome: DPA called to confirm if transport can be set up at 1700, as [facility] only has that available time. Per Roma pt can be transported at 1700, as long as DC summary is faxed to facility before.     1110  DPA hard fax DC summary to fax #: [phone].

## 2023-10-24 NOTE — DISCHARGE SUMMARY
Discharge Summary    CHIEF COMPLAINT ON ADMISSION  Chief Complaint   Patient presents with    Fall    Hip Injury     Known right intertrochanteric fracture s/p fall       Reason for Admission  EMS    Admission Date  10/21/2023     CODE STATUS  DNAR/DNI    HPI & HOSPITAL COURSE  This is a 89 y.o. female here with fall     89 y.o. female with past medical history of hypothyroidism, Alzheimer dementia, who was referred from the facility 10/21/2023 with right intertrochanteric fracture. Subsequent CT C-spine, CT head unremarkable, pelvic x-ray noted with right intertrochanteric fracture. Labs a lot of facility noted with leukocytosis white count 20,000, glucose 216, creatinine 1.82, lactic acid 6.9, UA negative for infection, COVID-negative, received 1 g Rocephin. Per patient's son who is at the bedside helping with history taking.  Patient fell down today while using bathroom. Patient does have a history of underlying heart disease or renal disease.      On arrival to [facility] ED, she remained tachycardic. Orthopedic surgery consulted.  Patient had elevated troponin needed close telemetry monitoring including serial labs, echocardiogram.  Significant lactacidosis requiring IV fluid, IV antibiotics and further work-up.  Patient was taken for right femur cephalomedullary nail on 10/22.  Patient had some expected postop anemia requiring 1 unit PRBC.  Her hemoglobin has stabilized.  She also had some acute respiratory failure while admitted, her oxygen is now down to 0.5 L nasal cannula. Orthopedic surgery cleared for DC on aspirin daily for DVT prophylaxis for 4 weeks post op.  PT/OT recommended postacute placement.  Patient is mildly tachycardic but otherwise stable for transfer to SNF.  She is to follow-up with orthopedic surgery in 2 to 3 weeks.    Therefore, she is discharged in fair and stable condition to skilled nursing facility.    The patient met 2-midnight criteria for an inpatient stay at the time of  discharge.      FOLLOW UP ITEMS POST DISCHARGE  Follow-up with orthopedic surgery in 2 to 3 weeks  Follow-up with primary care as needed    DISCHARGE DIAGNOSES  Principal Problem:    Closed intertrochanteric fracture of right femur (HCC) (POA: Unknown)  Active Problems:    ANETTE (acute kidney injury) (HCC) (POA: Unknown)    Lactic acid acidosis (POA: Unknown)    Leucocytosis (POA: Unknown)    Troponin level elevated (POA: Unknown)    Elevated troponin (POA: Yes)    Hypothyroidism (POA: Unknown)    Alzheimer disease (HCC) (POA: Unknown)    Acute respiratory failure with hypoxia (HCC) (POA: Unknown)    DNR (do not resuscitate) (POA: Yes)    Closed fracture of multiple ribs of right side (POA: Unknown)    Acute blood loss anemia (POA: Unknown)  Resolved Problems:    Goals of care, counseling/discussion (POA: Unknown)      FOLLOW UP  No future appointments.  [facility]  [address] [address]  [phone]        Jc Escobar M.D.  [address] [id number] [phone]    Follow up  Follow up with orthopedic surgery in 2-3 weeks      MEDICATIONS ON DISCHARGE     Medication List        START taking these medications        Instructions   acetaminophen 325 MG Tabs  Commonly known as: Tylenol   Take 2 Tablets by mouth every four hours as needed for Mild Pain, Fever or Moderate Pain.  Dose: 650 mg     aspirin 81 MG EC tablet   Take 1 Tablet by mouth every day for 28 days.  Dose: 81 mg     lidocaine 5 % Ptch  Commonly known as: Lidoderm   Place 1 Patch on the skin every 24 hours. Apply to right ribs. Patch(es) may remain in place for up to 12 hours in any 24-hour period. Patient should remain without lidocaine patch for 12 hours to avoid adverse reaction.  Dose: 1 Patch            CONTINUE taking these medications        Instructions   citalopram 10 MG tablet  Commonly known as: CeleXA   Take 10 mg by mouth every evening.  Dose: 10 mg     donepezil 5 MG  Tabs  Commonly known as: Aricept   Take 5 mg by mouth every evening.  Dose: 5 mg     levothyroxine 88 MCG Tabs  Commonly known as: Synthroid   Take 88 mcg by mouth every evening.  Dose: 88 mcg              Allergies  No Known Allergies    DIET  Orders Placed This Encounter   Procedures    Diet Order Diet: Level 6 - Soft and Bite Sized (ground meats; medications as tolerated in puree); Liquid level: Level 0 - Thin; Tray Modifications (optional): No Straws, SLP - 1:1 Supervision by Nursing, SLP - Deliver to Nursing Station     Standing Status:   Standing     Number of Occurrences:   1     Order Specific Question:   Diet:     Answer:   Level 6 - Soft and Bite Sized [23]     Comments:   ground meats; medications as tolerated in puree     Order Specific Question:   Liquid level     Answer:   Level 0 - Thin     Order Specific Question:   Tray Modifications (optional)     Answer:   No Straws     Order Specific Question:   Tray Modifications (optional)     Answer:   SLP - 1:1 Supervision by Nursing     Order Specific Question:   Tray Modifications (optional)     Answer:   SLP - Deliver to Nursing Station       ACTIVITY  As tolerated.  Weight bearing as tolerated    LINES, DRAINS, AND WOUNDS  This is an automated list. Peripheral IVs will be removed prior to discharge.  Peripheral IV 10/23/23 20 G Distal;Posterior;Right Forearm (Active)   Site Assessment Clean;Dry;Intact 10/23/23 2024   Dressing Type Transparent 10/23/23 2024   Line Status Scrubbed the hub prior to access;Flushed;Saline locked 10/23/23 2024   Dressing Status Clean;Dry;Intact 10/23/23 2024   Dressing Intervention N/A 10/23/23 2024   Infiltration Grading ([facility]) 0 10/23/23 2024   Phlebitis Scale ([facility]) 0 10/23/23 2024     External Urinary Catheter (Female Wick) (Active)   Collection Container Suction container 10/22/23 2000   Output (mL) 0 mL 10/23/23 2330      Wound 10/22/23 Incision Hip Right xeroform, 4x4, tegaderm (Active)   Site Assessment  JUJU 10/23/23 2024   Periwound Assessment JUJU 10/23/23 2024   Drainage Amount Small 10/23/23 2024   Drainage Description Serosanguineous 10/23/23 2024   Dressing Status Dry;Intact;Old drainage 10/23/23 2024   Dressing Changed Observed 10/23/23 0930   Dressing Options Petrolatum Gauze (yellow);Dry Gauze;Transparent Film 10/23/23 2024       Peripheral IV 10/23/23 20 G Distal;Posterior;Right Forearm (Active)   Site Assessment Clean;Dry;Intact 10/23/23 2024   Dressing Type Transparent 10/23/23 2024   Line Status Scrubbed the hub prior to access;Flushed;Saline locked 10/23/23 2024   Dressing Status Clean;Dry;Intact 10/23/23 2024   Dressing Intervention N/A 10/23/23 2024   Infiltration Grading ([facility] 0 10/23/23 2024   Phlebitis Scale ([facility]) 0 10/23/23 2024               MENTAL STATUS ON TRANSFER             CONSULTATIONS  Orthopedic surgery     PROCEDURES  10/22/2023  PROCEDURE PERFORMED: Right femur cephalomedullary nail    LABORATORY  Lab Results   Component Value Date    SODIUM 134 (L) 10/24/2023    POTASSIUM 4.5 10/24/2023    CHLORIDE 104 10/24/2023    CO2 21 10/24/2023    GLUCOSE 96 10/24/2023    BUN 35 (H) 10/24/2023    CREATININE 1.83 (H) 10/24/2023        Lab Results   Component Value Date    WBC 11.8 (H) 10/24/2023    HEMOGLOBIN 7.5 (L) 10/24/2023    HEMATOCRIT 22.4 (L) 10/24/2023    PLATELETCT 151 (L) 10/24/2023        Total time of the discharge process exceeds 33 minutes.

## 2023-10-24 NOTE — PROGRESS NOTES
Discharge instructions reviewed and signed. IV and tele monitor removed. Pt. Awaiting transport scheduled between 1320-2272.

## 2023-10-24 NOTE — CARE PLAN
The patient is Stable - Low risk of patient condition declining or worsening    Shift Goals  Clinical Goals: monitor surgical sites, bladder scan q6h  Patient Goals: pain mgmt, sleep    Progress made toward(s) clinical / shift goals:      Problem: Fall Risk  Goal: Patient will remain free from falls  Outcome: Progressing     Problem: Skin Integrity  Goal: Skin integrity is maintained or improved  Outcome: Progressing     Problem: Discharge Barriers/Planning  Goal: Patient's continuum of care needs are met  Outcome: Progressing     Problem: Respiratory  Goal: Patient will achieve/maintain optimum respiratory ventilation and gas exchange  Outcome: Progressing     Problem: Risk for Aspiration  Goal: Patient's risk for aspiration will be absent or decrease  Outcome: Progressing     Problem: Mobility  Goal: Patient's capacity to carry out activities will improve  Outcome: Progressing     Problem: Infection - Standard  Goal: Patient will remain free from infection  Outcome: Progressing     Problem: Pain - Standard  Goal: Alleviation of pain or a reduction in pain to the patient’s comfort goal  Outcome: Progressing       Patient is not progressing towards the following goals:

## 2023-10-24 NOTE — PROGRESS NOTES
Bedside report received from night RN; assumed pt care. Pt assessment complete. Pt A&Ox2 to self and situation Reviewed plan of care with pt. Pt tele monitored. Chart and labs reviewed. Bed in lowest position, and 2 side rails up. Pt educated on call light; call light with in reach.

## 2023-10-24 NOTE — THERAPY
"Speech Language Pathology   Daily Treatment     Patient Name: Kathleen Mendieta  AGE:  89 y.o., SEX:  female  Medical Record #: [id number]  Date of Service: 10/24/2023      Precautions:  Precautions: Fall Risk, Swallow Precautions, reflux precautions, WBAT R LE       Subjective  Pt agreeable and cooperative with SLP tx tasks. \"Even though it's a hospital, it's been so nice to be here.\"    Pending d/c later today to [facility] per RN/EMR.    Assessment  Pt seen for dysphagia management. On 0.5L Oxymask, agreeable to be sat up to 90* despite back pain (RN aware).     TN0 water used to facilitate exercises targeting deficits noted on MBSS - pt completed effortful swallow x35 with good effort and fair-good accuracy. Attempted both Rosalia and lingual pull-backs with inconsistent accuracy and good effort (8 attempts total between). Consistent throat clear during trials, consistent with MBSS, and no prolonged cough responses. Briefly reviewed importance of reflux precautions and upright positioning with PO; pt agreeable. Discharge instructions updated.       Clinical Impressions  Per MBSS, pt demonstrates a mild-moderate oropharyngeal dysphagia with a component of esophageal dysphagia. Would recommend SB/TN diet at this time with use of strict spv with PO and reflux precautions. Pt may benefit from GI consult/work-up. Pt appears to be a good candidate for ongoing behavorial and exercise-based swallow rehabilitation. Dysphagia outcomes can be maximized with use of mobility as pt is able and frequent, thorough oral care.       Recommendations  Soft and bite sized solids and thin liquids  Instrumentation: Instrumental swallow study pending clinical progress  Medication: Crush with applesauce, as appropriate, Crush with pudding/puree, as appropriate  Supervision: 1:1 feeding with constant supervision, Careful 1:1 hand feeding (ok for family to supervise/assist with PO intake)  Positioning: Fully upright and midline during oral " "intake, Remain upright for 30-45 minutes after oral intake, Meals sitting upright in a chair, as tolerated  Risk Management : Small bites/sips, Slow rate of intake, Alternate bites and sips, Periodic throat clear okay, Swallow x2 per bite  Oral Care: BID  Consult Referral(s): Gastroenterologist        SLP Treatment Plan  Treatment Plan: Dysphagia Treatment, Patient/Family/Caregiver Training  SLP Frequency: 4x Per Week  Estimated Duration: Until Therapy Goals Met      Anticipated Discharge Needs  Discharge Recommendations: Recommend post-acute placement for additional speech therapy services prior to discharge home  Therapy Recommendations Upon DC: Dysphagia Training, Patient / Family / Caregiver Education, Community Re-Integration (would also recommend GI work-up)      Patient / Family Goals  Patient / Family Goal #1: \"Can I have some water?\"  Goal #1 Outcome: Progressing as expected  Short Term Goals  Short Term Goal # 1: Pt will consume SB6/TN0 free from clinical s/sx aspiration or change in respiratory status.  Goal Outcome # 1: Progressing as expected  Short Term Goal # 2: Pt will participate in MBSS to objectively observe oropharyngeal swallow function and inform SLP plan of care.  Goal Outcome # 2 : Goal met, new goal aded  Short Term Goal # 2 B : Pt will complete exercises targeting BOT retraction, pharyngeal shortening, and LVC x30 in a session.  Goal Outcome  # 2 B: Progressing as expected      Fariha Cohen, JOANNE  "

## 2023-10-24 NOTE — DISCHARGE PLANNING
Care Transition Team Final Discharge Disposition    Actual Discharge Information  Discharge Disposition: D/T to SNF with Medicare cert in anticipation of skilled care (03)      Pt cleared for DC today to SNF  She has been accepted to [facility] for rehab - Per BILLY Mota, pt has been accepted for admission today  Met with pt and family at bedside, updated to above, they agree with DC today  Transport request sent to [employer] [facility] for ETA [phone]

## 2023-10-24 NOTE — DISCHARGE PLANNING
DC Transport Scheduled    Received request at: 10/24/2023 at 0915    Transport Company Scheduled:  [employer]  Spoke with [facility] at [facility] to schedule transport.  [facility] Trip #: [id number]     Scheduled Date: 10/24/2023  Scheduled Time: 1700    Destination: [facility]     Notified care team of scheduled transport via Voalte.     If there are any changes needed to the DC transportation scheduled, please contact [facility] at ext. [phone] between the hours of [phone] Mon-Fri. If outside those hours, contact the ED Case Manager at ext. [phone].

## 2023-10-24 NOTE — DISCHARGE INSTRUCTIONS
Resume pre-operative diet upon discharge from the hospital. Depending on how you are feeling and whether you have nausea or not, you may wish to stay with a bland diet for the first few days. However, you can advance your diet as quickly as you feel ready.       CALL YOUR DOCTOR IF:     You have fever greater than 101.5 degrees F   You have increased or concerning wound drainage   You notice excessive bleeding   You notice a great deal of redness around your incision   Your pain can no longer be controlled   Decreased sensation in your toes or fingers   Discoloration of your toes or fingers   Increased wound drainage, persistent wound drainage   Increasing redness around the wound   Increasing or uncontrolled pain,   Your incision begins to open     If you suffer a medical emergency (chest pain, stroke symptoms, etc), you should contact 911 or proceed to your nearest Emergency Room immediately.          ACTIVITY: weight bearing as tolerated       ASSISTED DEVICES: You are being discharged with the following special equipment:  N/A     Physical Therapy:  If admitted to a skilled care facility, the patient should continue working with physical therapy on gait training and ambulation and should ambulate a minimum of 3 times daily with nursing or therapy.  If at home, home health or family can assist with ambulation.     Wound Care:  The patient should keep the wound clean and dry.  Daily dry gauze dressing changes should be performed until the wound is entirely dry and may continue to be performed as desired for comfort.  Your wound has been closed with staples, which will be removed 10-14 days after surgery.  Any new drainage from the wound, drainage lasting longer than 3 days post-operatively, wound opening/dehiscence, increased erythema, or other wound concerns should prompt an immediate call to the operative surgeon.  Please DO NOT initiate antibiotics or wound packing for wound concerns without discussing this  with the surgeon.     DVT Prophylaxis:  After your injury/surgery, you are at increased risk for the development of deep vein thrombosis (blood clot of deep leg veins) or a pulmonary embolism (blood clot in the lung).       To help prevent these complications, you will be discharged on aspirin     Medication Refills:  Pay attention to the number of medications - particularly pain medications - you have remaining.          Wound Care:  The patient should keep the wound clean and dry.  Daily dry gauze dressing changes should be performed until the wound is entirely dry and may continue to be performed as desired for comfort.  Your wound has been closed with staples, which will be removed 10-14 days after surgery.  Any new drainage from the wound, drainage lasting longer than 3 days post-operatively, wound opening/dehiscence, increased erythema, or other wound concerns should prompt an immediate call to the operative surgeon.  Please DO NOT initiate antibiotics or wound packing for wound concerns without discussing this with the surgeon.     DVT Prophylaxis:  After your injury/surgery, you are at increased risk for the development of deep vein thrombosis (blood clot of deep leg veins) or a pulmonary embolism (blood clot in the lung).       To help prevent thWound Care:  The patient should keep the wound clean and dry.  Daily dry gauze dressing changes should be performed until the wound is entirely dry and may continue to be performed as desired for comfort.  Your wound has been closed with staples, which will be removed 10-14 days after surgery.  Any new drainage from the wound, drainage lasting longer than 3 days post-operatively, wound opening/dehiscence, increased erythema, or other wound concerns should prompt an immediate call to the operative surgeon.  Please DO NOT initiate antibiotics or wound packing for wound concerns without discussing this with the surgeon.     DVT Prophylaxis:  After your injury/surgery,  you are at increased risk for the development of deep vein thrombosis (blood clot of deep leg veins) or a pulmonary embolism (blood clot in the lung).       To help prevent these complications, you will be discharged on aspirin se complications, you will be discharged on aspirin

## 2023-10-24 NOTE — DISCHARGE INSTR - SLP
Pharyngeal Phase  Pharyngeal phase characterized by impairments in BOT retraction, pharyngeal shortening, and LVC which resulted in the following:  - Shallow penetration with TN0 during the swallow that inconsistently cleared with completion of the pharyngeal swallow but would consistently be cleared with spontaneous throat clear. Considering intact sensation, this can be functional for age.   - Moderate to severe vallecular residue with solid trials that did not clear spontaneously  - Mild pyriform sinus residue with TN0, MT2, PU4, and RG7, as well as moderate pyriform sinus residue with LQ3, that did not clear spontaneously  - Trace PPW residue throughout     Esophageal Phase  AP esophageal screen was not completed given fluoro suite limitations as well as pt kyphosis/limited independence with mobility. However, consistent retention and retrograde flow noted in lateral view of the proximal esophagus with concern for esophageal anatomical changes. Retention did not completely pass from view during study. No retrograde flow noted above the level of the UES; cannot reasonably rule out.         Compensatory Strategies:  Throat clear - EFFECTIVE to clear penetrate  Liquid wash - EFFECTIVE to reduce residue  Cleansing swallows - EFFECTIVE to reduce residue       Recommendations  Soft and bite sized solids and thin liquids  Instrumentation: Instrumental swallow study pending clinical progress  Medication: Crush with applesauce, as appropriate, Crush with pudding/puree, as appropriate  Supervision: 1:1 feeding with constant supervision, Careful 1:1 hand feeding (ok for family to supervise/assist with PO intake)  Positioning: Fully upright and midline during oral intake, Remain upright for 30-45 minutes after oral intake, Meals sitting upright in a chair, as tolerated  Risk Management : Small bites/sips, Slow rate of intake, Alternate bites and sips, Periodic throat clear okay, Swallow x2 per bite  Oral Care: BID  Consult  Referral(s): Gastroenterologist

## 2023-10-24 NOTE — DISCHARGE PLANNING
[facility] [place].  [facility] will no longer follow.  Please reach out to myself with any questions.

## 2023-10-24 NOTE — THERAPY
Occupational Therapy Contact Note    Patient Name: Kathleen Mendieta  Age:  89 y.o., Sex:  female  Medical Record #: [id number]  Today's Date: 10/24/2023    Pt pending d/c to [facility] this PM. Will hold OT eval, but continue to follow in case of change in POC.

## 2023-10-24 NOTE — PROGRESS NOTES
"      Orthopaedic Progress Note    Interval changes:  Patient doing well    Right hip dressings are CDI  Cleared for DC to SNF by ortho pending medicine clearance    ROS - Patient denies any new issues.  Pain well controlled.    BP 98/46   Pulse (!) 103   Temp 36.9 °C (98.4 °F) (Temporal)   Resp 18   Ht 1.575 m (5' 2\")   Wt 63.4 kg (139 lb 12.4 oz)   SpO2 95%     Patient seen and examined  No acute distress  Breathing non labored  RRR  RLE dressings CDI, DNVI, moves all toes, cap refill <2 sec.      Recent Labs     10/22/23  0201 10/23/23  0049 10/23/23  1241 10/23/23  1639 10/24/23  0057   WBC 11.8* 12.5*  --   --  11.8*   RBC 2.81* 2.05*  --   --  2.37*   HEMOGLOBIN 8.6* 6.5* 8.2* 7.6* 7.5*   HEMATOCRIT 27.3* 20.1* 24.7* 22.4* 22.4*   MCV 97.2 98.0*  --   --  94.5   MCH 30.6 31.7  --   --  31.6   MCHC 31.5* 32.3  --   --  33.5   RDW 52.7* 54.8*  --   --  57.7*   PLATELETCT 210 181  --   --  151*   MPV 9.9 10.2  --   --  10.6       Active Hospital Problems    Diagnosis     Closed fracture of multiple ribs of right side [S22.41XA]     Acute blood loss anemia [D62]     Acute respiratory failure with hypoxia ([facility]) [J96.01]     DNR (do not resuscitate) [Z66]     Closed intertrochanteric fracture of right femur ([facility]) [S72.141A]     ANETTE (acute kidney injury) ([facility]) [N17.9]     Lactic acid acidosis [E87.20]     Leucocytosis [D72.829]     Troponin level elevated [R79.89]     Elevated troponin [R79.89]     Hypothyroidism [E03.9]     Alzheimer disease ([facility]) [G30.9, F02.80]        Assessment/Plan:  Patient doing well   Right hip dressings are CDI  Cleared for DC to SNF by ortho pending medicine clearance  POD#2 S/P Right femur cephalomedullary nail  Wt bearing status - WBAT  Wound care/Drains - Dressings to be changed every other day by nursing. Or PRN for saturation starting POD#2  Future Procedures - none planned   Sutures/Staples out- 14-21 days post operatively. Removal will completed by ortho mid levels " only.  PT/OT-initiated  Antibiotics: Perioperative completed  DVT Prophylaxis- TEDS/SCDs/Foot pumps  Britton-not needed per ortho  Case Coordination for Discharge Planning - Disposition per therapy recs.

## 2023-10-25 NOTE — DOCUMENTATION QUERY
[facility]                                                                       Query Response Note      PATIENT:               ANABEL COX  ACCT #:                  [id number]  MRN:                     [id number]  :                      1931  ADMIT DATE:       10/21/2023 1:33 PM  DISCH DATE:          RESPONDING  PROVIDER #:        [id number]           QUERY TEXT:    Acute Respiratory Failure with hypoxia is documented in the Medical Record. Please specify the underlying cause (includes suspected or probable)      The patient's Clinical Indicators include:  Findings: 10/22 PN: Post-operatively her oxygen saturations have dropped and she is requiring 10 liters of oxygen. NPO until speech path sees her given the risk of post-anesthesia aspiration (family notes that she has trouble swallowing baseline).  Chest xray ordered.    Epic 10/22 V/S oxygen saturations: % respiratory rate: 10-, 10/23 oxygen saturations % Respiratory rate 15-19    10/22 Chest xray: No acute cardiopulmonary abnormality identified, right rib fracture    10/21-10/23 Epic: oxygen saturation: %  Respiratory rate: 10-    Treatment: supplemental oxygen, chest xray speech evaluation transfusion    Risk Factors: trouble swallowing baseline, Right femur cephalomedullary nail 10/22, past medical history heart failure acute blood loss anemia       SaidayouJennifer RN BSN  Clinical Documentation   [email or web address]  Connect via Voalte Messenger  Options provided:   -- Acute respiratory failure with hypoxia ruled out   -- Acute respiratory failure with hypoxia due to underlying conditions-, (please document underlying conditions)   -- Acute respiratory failure with hypoxia due to procedure   -- Other cause of respiratory failure, (please document other cause of respiratory failure)      Query created by: Jennifer Gray on 10/24/2023 11:46  AM    RESPONSE TEXT:    Acute respiratory failure with hypoxia due to procedure          Electronically signed by:  RICHARD MARQUEZ MD 10/24/2023 5:39 PM

## 2023-11-06 PROBLEM — S72.001S CLOSED RIGHT HIP FRACTURE, SEQUELA: Status: ACTIVE | Noted: 2023-01-01

## 2023-11-06 PROBLEM — D64.9 ANEMIA: Status: ACTIVE | Noted: 2023-01-01

## 2023-11-07 PROBLEM — D62 ACUTE BLOOD LOSS ANEMIA (ABLA): Status: ACTIVE | Noted: 2023-01-01

## 2023-11-07 PROBLEM — J98.6 ELEVATED HEMIDIAPHRAGM: Status: ACTIVE | Noted: 2023-01-01

## 2023-11-07 NOTE — ED NOTES
Med rec updated and complete. Allergies reviewed.  Per MARS from SNF/SENIOR living facility attached to [facility].

## 2023-11-07 NOTE — PROGRESS NOTES
Hospital Medicine Daily Progress Note    Date of Service  11/7/2023    Chief Complaint  Kathleen Mendieta is a 89 y.o. female admitted 11/6/2023 with right hip pain.    Hospital Course  Kathleen Mendieta is a 89 y.o. female who presented 11/6/2023 with hip pain, postop complication.  Very pleasant woman with history of hypothyroidism, Alzheimer's dementia, recent right intertrochanteric hip fracture on 10/21/2023 after a fall in the bathroom.  She was taken for right femur cephalomedullary nail on 10/22.  Postop course complicated by anemia requiring 1 unit PRBC transfusion, hemoglobin stable since then.  She was discharged to SNF for PT/OT.  She was brought into [facility] after she was noted to have acute worsening of hip pain.  Imaging showed 90 degree angulation of right intertrochanteric femoral neck with penetration of dynamic hip screw through the femoral head penetrating into the superior acetabulum, as well as stable left inferior pubic ramus fracture.  Orthopedics was consulted, Dr. Escobar, will see patient and have discussion with her and family about how to proceed.  At the time of my evaluation the patient denies any symptoms, she reports feeling well, her only complaint is a dry mouth after being NPO.    Interval Problem Update  11/07/23  Patient was seen and examined in the emergency room.  Spoke with the patient at bedside with the son.  Patient had increasing right hip pain following her physical therapy sessions at the skilled nursing facility.  Found to have problem with the right hip fracture repair.  Patient is alert and orient x2.  Complaining of some right hip pain currently controlled.  Currently on 2 LPM oxygen by nasal cannula.  Awaiting for surgery today by Dr. Escobar.      I have discussed this patient's plan of care and discharge plan at IDT rounds today with Case Management, Nursing, Nursing leadership, and other members of the IDT team.    Consultants/Specialty  orthopedics    Code  Status  DNAR/DNI    Disposition  The patient is not medically cleared for discharge to home or a post-acute facility.  Anticipate discharge to: skilled nursing facility    I have placed the appropriate orders for post-discharge needs.    Review of Systems  Review of Systems   Constitutional:  Negative for chills and fever.   Respiratory:  Negative for cough and shortness of breath.    Cardiovascular:  Negative for chest pain and palpitations.   Gastrointestinal:  Negative for abdominal pain, nausea and vomiting.   Genitourinary:  Negative for dysuria and hematuria.   Musculoskeletal:  Positive for joint pain. Negative for falls and myalgias.   Neurological:  Negative for dizziness and headaches.        Physical Exam  Temp:  [36.1 °C (97 °F)-37.3 °C (99.1 °F)] 37.3 °C (99.1 °F)  Pulse:  [] 102  Resp:  [16-46] 20  BP: ()/(41-92) 109/53  SpO2:  [87 %-99 %] 97 %    Physical Exam  Vitals and nursing note reviewed.   Constitutional:       Appearance: Normal appearance. She is normal weight. She is not ill-appearing or diaphoretic.   HENT:      Head: Normocephalic and atraumatic.      Mouth/Throat:      Mouth: Mucous membranes are moist.      Pharynx: Oropharynx is clear. No oropharyngeal exudate.   Eyes:      General:         Right eye: No discharge.         Left eye: No discharge.      Conjunctiva/sclera: Conjunctivae normal.      Pupils: Pupils are equal, round, and reactive to light.   Cardiovascular:      Rate and Rhythm: Normal rate and regular rhythm.      Pulses: Normal pulses.      Heart sounds: Normal heart sounds. No murmur heard.  Pulmonary:      Effort: Pulmonary effort is normal. No respiratory distress.      Breath sounds: Normal breath sounds.   Abdominal:      General: Abdomen is flat. Bowel sounds are normal. There is no distension.      Palpations: Abdomen is soft.      Tenderness: There is no abdominal tenderness.   Musculoskeletal:         General: Deformity (Externally rotated right lower  extremity) present.      Cervical back: Neck supple. No tenderness.      Right lower leg: No edema.      Left lower leg: No edema.   Neurological:      Mental Status: She is alert. She is disoriented.      Sensory: No sensory deficit.      Motor: No weakness.      Comments: Alert and orient x2   Psychiatric:         Mood and Affect: Mood normal.         Thought Content: Thought content normal.         Fluids    Intake/Output Summary (Last 24 hours) at 11/7/2023 1533  Last data filed at 11/7/2023 1416  Gross per 24 hour   Intake 250 ml   Output 300 ml   Net -50 ml       Laboratory  Recent Labs     11/06/23 2114 11/07/23  1455   WBC 8.1  --    RBC 2.33*  --    HEMOGLOBIN 7.8* 9.1*   HEMATOCRIT 25.1*  --    .7*  --    MCH 33.5*  --    MCHC 31.1*  --    RDW 73.4*  --    PLATELETCT 440  --    MPV 9.2  --      Recent Labs     11/06/23 2114   SODIUM 138   POTASSIUM 4.5   CHLORIDE 103   CO2 26   GLUCOSE 94   BUN 22   CREATININE 1.23   CALCIUM 7.8*     Recent Labs     11/06/23 2114   INR 1.12               Imaging  DX-CHEST-PORTABLE (1 VIEW)   Final Result      1.  Persistently enlarged cardiac silhouette   2.  Bibasilar underinflation atelectasis which could obscure an additional process.      DX-HIP-BILATERAL-WITH PELVIS-2 VIEWS   Final Result         1.  90 degree angulation of right intertrochanteric femoral neck with penetration of dynamic hip screw through the femoral head penetrating into the superior acetabulum.   2.  Left inferior pubic ramus fracture, stable since prior study.      DX-PORTABLE FLUORO > 1 HOUR    (Results Pending)   DX-HIP-UNILATERAL-W/O PELVIS-2/3 VIEWS RIGHT    (Results Pending)   DX-PELVIS-1 OR 2 VIEWS    (Results Pending)   DX-FEMUR-1 VIEW RIGHT    (Results Pending)        Assessment/Plan  * Closed right hip fracture, sequela- (present on admission)  Assessment & Plan  Hip fracture on 10/21 with operative repair 10/22 with a cephalomedullary nail.  Brought back from SNF due to acute  worsening of pain.  Imaging showed 90 degree angulation of femoral neck with penetration of hip screw through the femoral head into the superior acetabulum.  -Orthopedics on board, Dr. Escobar  -N.p.o.  -Patient not complaining of pain at this time, Tylenol as needed, escalate to narcotics if needed however will hold off on ordering for now to avoid delirium in patient with dementia and advanced age, start with low dose if needed  -PT/OT when appropriate    11/07/23  Plan for surgery with Dr. Escobar today.  Continue NPO.  Oxycodone as needed for pain  Patient will need PT OT evaluations and will likely need to go back to a skilled nursing facility for further physical therapies.      Acute blood loss anemia (ABLA)- (present on admission)  Assessment & Plan  Postop blood loss anemia requiring transfusion after operation.  Hemoglobin stable at 7.8  Continue to monitor, check CBC after surgery, transfuse for goal greater than 7    11/07/23  Orthopedic surgery requesting transfusion 1 unit PRBC due to anticipated significant blood loss from the surgery.  I have ordered 1 unit PRBC.    Acute respiratory failure with hypoxia (HCC)- (present on admission)  Assessment & Plan  Patient was hypoxic down to 87% on room air now requiring 2 LPM oxygen by nasal count.     Chest x-ray per my review, shows elevated right hemidiaphragm with hyperinflation.  Sharp costophrenic angles with no focal consolidation.    Continue oxygen as per respiratory protocol.  Continuous pulse oximetry.  Respiratory therapy consult.  Duo nebs every 4 hours and as needed.  Incentive spirometry.      Elevated hemidiaphragm- (present on admission)  Assessment & Plan  As per chest x-ray.  On the right.  Could be due to poor mobilization and hypoinflation.    Respiratory consult, incentive spirometry    Alzheimer disease (HCC)- (present on admission)  Assessment & Plan  Continue donepezil and citalopram    Hypothyroidism- (present on  admission)  Assessment & Plan  Continue levothyroxine         VTE prophylaxis:   SCDs/TEDs   heparin ppx      I have performed a physical exam and reviewed and updated ROS and Plan today (11/7/2023). In review of yesterday's note (11/6/2023), there are no changes except as documented above.

## 2023-11-07 NOTE — ED NOTES
Pt and son agreeable to blood transfusion. Pt signed consent with son at bedside. Pt to receive transfusion prior to surgery.

## 2023-11-07 NOTE — ED NOTES
Bedside report to [facility]     Pt is resting in bed. Call light within reach. Gurney in low position. Side rails up.  Gurney brakes locked.        Oxygen: 2lpm via NC connected to wall  Cardiac monitor: Yes  Pulse ox: Yes  Fall risk: Moderate (Son at bedside)  Bed alarm: NO  Purewick: Yes  PIV: G20 left AC patent and intact; ongoing LR 83ml/hr

## 2023-11-07 NOTE — ED PROVIDER NOTES
ED Provider Note    CHIEF COMPLAINT  Chief Complaint   Patient presents with    Hip Pain     BIB EMS from [place] for post op hip pain. Patient had femur fracture repair on 10/22 and arrives today with an increase in pain.        EXTERNAL RECORDS REVIEWED  Patient had a closed nondisplaced intertrochanteric fracture of the right femur which was repaired on 22 October by Dr. Escobar    HPI/ROS  LIMITATION TO HISTORY   Patient is poor historian  OUTSIDE HISTORIAN(S):  EMS Report included below.  I also discussed this case with the referring doctor.    Kathleen Mendieta is a 89 y.o. female who presents with right hip pain.  She had a fall on 21 October while using the bathroom and was unable to get up or ambulate.  She underwent operative fixation of the right hip where the nail was placed for stabilization of the right anterior trochanteric femur fracture.  Patient was recovering well postoperatively requiring minimal pain control.  She presented to outside ER for acute worsening of her pain.  She denies any repeat injury.  Repeat imaging shows the hip screw fixating the patient's previously seen intratrochanteric fracture of the right femur has an atypical appearance with the screw extending through the superior aspect of the right femoral head and possibly into the supra-acetabular portion of the right ilium.    At this time she denies any pain.  She denies any medical complaints.    PAST MEDICAL HISTORY   has a past medical history of Dementia (HCC), Glaucoma, Heart failure (HCC), and Hypothyroidism.    SURGICAL HISTORY   has a past surgical history that includes open fix inter/subtroch fx,implnt (Right, 10/22/2023).    FAMILY HISTORY  No family history on file.    SOCIAL HISTORY  Social History     Tobacco Use    Smoking status: Never    Smokeless tobacco: Never   Substance and Sexual Activity    Alcohol use: Not on file    Drug use: Not on file    Sexual activity: Not on file       CURRENT MEDICATIONS  Home  "Medications       Reviewed by Percy Arango (Pharmacy Tech) on 11/06/23 at 2332  Med List Status: Complete     Medication Last Dose Status   acetaminophen (ACETAMINOPHEN 8 HOUR) 650 MG CR tablet 11/6/2023 Active   ascorbic acid (ASCORBIC ACID) 500 MG Tab 11/6/2023 Active   aspirin 81 MG EC tablet 11/6/2023 Active   citalopram (CELEXA) 10 MG tablet 11/5/2023 Active   docusate sodium (COLACE) 100 MG Cap 11/5/2023 Active   donepezil (ARICEPT) 5 MG Tab 11/5/2023 Active   guaiFENesin ER (MUCINEX) 600 MG TABLET SR 12 HR 11/6/2023 Active   HYDROcodone-acetaminophen (NORCO) 5-325 MG Tab per tablet 11/6/2023 Active   ipratropium-albuterol (DUONEB) 0.5-2.5 (3) MG/3ML nebulizer solution 11/5/2023 Active   levothyroxine (SYNTHROID) 88 MCG Tab 11/6/2023 Active   lidocaine (LIDODERM) 5 % Patch 11/5/2023 Active                    ALLERGIES  No Known Allergies    PHYSICAL EXAM  VITAL SIGNS: /58   Pulse 86   Temp 36.8 °C (98.2 °F) (Temporal)   Resp 16   Ht 1.549 m (5' 1\")   Wt 63.5 kg (140 lb)   SpO2 98%   BMI 26.45 kg/m²    Constitutional: Awake and alert. Nontoxic  HENT:  Grossly normal  Eyes: Grossly normal  Neck: Normal range of motion  Cardiovascular: Normal heart rate   Thorax & Lungs: No respiratory distress  Abdomen: Nontender  Skin:  No pathologic rash.   Extremities: She has minimal tenderness to palpation of the right hip.  5/5 strength with ankle plantar and dorsiflexion.  She has normal sensation to light touch.  She has palpable DP and PT pulses  Psychiatric: Affect normal      DIAGNOSTIC STUDIES / PROCEDURES  EKG  I have independently interpreted this EKG  Results for orders placed or performed during the hospital encounter of 11/06/23   EKG   Result Value Ref Range    Report       [facility] Emergency Dept.    Test Date:  2023-11-06  Pt Name:    ANABEL COX                  Department: ER  MRN:        [id number]                      Room:       BL 18  Gender:     Female               "         Technician: [id number]  :        [id number]                   Requested By:SHAKILA RIBEIRO  Order #:    [id number]                    Reading MD: Shakila Ribeiro    Measurements  Intervals                                Axis  Rate:       85                           P:          22  OH:         216                          QRS:        -2  QRSD:       85                           T:          40  QT:         378  QTc:        450    Interpretive Statements  Sinus rhythm  Compared to ECG 10/22/2023 18:14:01  Prolonged QT interval no longer present  Electronically Signed On 2023 22:26:29 PST by Shakila Ribeiro           LABS  Results for orders placed or performed during the hospital encounter of 23   CBC WITH DIFFERENTIAL   Result Value Ref Range    WBC 8.1 4.8 - 10.8 K/uL    RBC 2.33 (L) 4.20 - 5.40 M/uL    Hemoglobin 7.8 (L) 12.0 - 16.0 g/dL    Hematocrit 25.1 (L) 37.0 - 47.0 %    .7 (H) 81.4 - 97.8 fL    MCH 33.5 (H) 27.0 - 33.0 pg    MCHC 31.1 (L) 32.2 - 35.5 g/dL    RDW 73.4 (H) 35.9 - 50.0 fL    Platelet Count 440 164 - 446 K/uL    MPV 9.2 9.0 - 12.9 fL    Neutrophils-Polys 79.20 (H) 44.00 - 72.00 %    Lymphocytes 8.80 (L) 22.00 - 41.00 %    Monocytes 8.10 0.00 - 13.40 %    Eosinophils 3.10 0.00 - 6.90 %    Basophils 0.20 0.00 - 1.80 %    Immature Granulocytes 0.60 0.00 - 0.90 %    Nucleated RBC 0.00 0.00 - 0.20 /100 WBC    Neutrophils (Absolute) 6.37 1.82 - 7.42 K/uL    Lymphs (Absolute) 0.71 (L) 1.00 - 4.80 K/uL    Monos (Absolute) 0.65 0.00 - 0.85 K/uL    Eos (Absolute) 0.25 0.00 - 0.51 K/uL    Baso (Absolute) 0.02 0.00 - 0.12 K/uL    Immature Granulocytes (abs) 0.05 0.00 - 0.11 K/uL    NRBC (Absolute) 0.00 K/uL    Anisocytosis 1+     Macrocytosis 1+    COMP METABOLIC PANEL   Result Value Ref Range    Sodium 138 135 - 145 mmol/L    Potassium 4.5 3.6 - 5.5 mmol/L    Chloride 103 96 - 112 mmol/L    Co2 26 20 - 33 mmol/L    Anion Gap 9.0 7.0 - 16.0    Glucose 94 65 - 99 mg/dL    Bun 22 8 - 22 mg/dL     Creatinine 1.23 0.50 - 1.40 mg/dL    Calcium 7.8 (L) 8.5 - 10.5 mg/dL    Correct Calcium 8.8 8.5 - 10.5 mg/dL    AST(SGOT) 24 12 - 45 U/L    ALT(SGPT) 12 2 - 50 U/L    Alkaline Phosphatase 162 (H) 30 - 99 U/L    Total Bilirubin 0.8 0.1 - 1.5 mg/dL    Albumin 2.7 (L) 3.2 - 4.9 g/dL    Total Protein 5.6 (L) 6.0 - 8.2 g/dL    Globulin 2.9 1.9 - 3.5 g/dL    A-G Ratio 0.9 g/dL   PT/INR   Result Value Ref Range    PT 14.5 12.0 - 14.6 sec    INR 1.12 0.87 - 1.13   ESTIMATED GFR   Result Value Ref Range    GFR (CKD-EPI) 41 (A) >60 mL/min/1.73 m 2   PLATELET ESTIMATE   Result Value Ref Range    Plt Estimation Normal    MORPHOLOGY   Result Value Ref Range    RBC Morphology Present     Stomatocytes 1+    PERIPHERAL SMEAR REVIEW   Result Value Ref Range    Peripheral Smear Review see below    DIFFERENTIAL COMMENT   Result Value Ref Range    Comments-Diff see below    EKG   Result Value Ref Range    Report       [facility] Emergency Dept.    Test Date:  2023  Pt Name:    ANABEL COX                  Department: ER  MRN:        [id number]                      Room:       [facility]  Gender:     Female                       Technician: [id number]  :        [id number]                   Requested By:SHAKILA RIBEIRO  Order #:    [id number]                    Reading MD: Shakila Ribeiro    Measurements  Intervals                                Axis  Rate:       85                           P:          22  UT:         216                          QRS:        -2  QRSD:       85                           T:          40  QT:         378  QTc:        450    Interpretive Statements  Sinus rhythm  Compared to ECG 10/22/2023 18:14:01  Prolonged QT interval no longer present  Electronically Signed On 2023 22:26:29 PST by Shakila Ribeiro           RADIOLOGY  I have independently interpreted the diagnostic imaging associated with this visit and am waiting the final reading from the radiologist.   My preliminary interpretation is as  follows: Dynamic hip screw through femoral head into acetabulum  Radiologist interpretation:   DX-HIP-BILATERAL-WITH PELVIS-2 VIEWS   Final Result         1.  90 degree angulation of right intertrochanteric femoral neck with penetration of dynamic hip screw through the femoral head penetrating into the superior acetabulum.   2.  Left inferior pubic ramus fracture, stable since prior study.            COURSE & MEDICAL DECISION MAKING    ED Observation Status? No; Patient does not meet criteria for ED Observation.     INITIAL ASSESSMENT, COURSE AND PLAN  Care Narrative: This is a 89-year-old who presents as a transfer from [place] for right hip pain.  She underwent operative  fixation of the right hip on 22 October by Dr. Escobar.  She presented to outside facility with worsening pain and was noted to have hip screw with an atypical appearance extending through the superior aspect of the right femoral head into the acetabulum.  Patient denies any recurrent trauma or head injury.  Labs are notable for hemoglobin of 7.8, mild hypokalemia, otherwise no significant derangements.  I do not see an indication for further imaging.  I did discuss with Dr. Escobar who will discuss with family in the morning and plan for possible operative intervention. Dr. Ybarra accepted patient for admission    DISPOSITION AND DISCUSSIONS  I have discussed management of the patient with the following physicians and WIN's:  Dr. Escobar. Dr. Ybarra    Discussion of management with other [facility] or appropriate source(s): None       FINAL DIAGNOSIS  1. Right hip pain Acute   2. Anemia, unspecified type    3. Closed fracture of right hip, initial encounter ([facility])           Electronically signed by: Shakila Graham M.D., 11/6/2023 9:00 PM

## 2023-11-07 NOTE — OP REPORT
DATE OF OPERATION: 11/7/2023     PREOPERATIVE DIAGNOSIS: Failed fixation right intertrochanteric femur fracture    POSTOPERATIVE DIAGNOSIS: Same    PROCEDURE PERFORMED: Conversion right proximal femur to hip arthroplasty    SURGEON: Jc Escobar M.D.     ASSISTANT: Nikita Barnes MD, fellow    ANESTHESIA: General    SPECIMEN: None    ESTIMATED BLOOD LOSS: 100 mL    IMPLANTS: [place] 115 x 19 mm restoration modular femoral stem, 28+4 bipolar head      INDICATIONS: The patient is a 89 y.o. female who presented with failed fixation right intertrochanteric femur fracture was short cephalomedullary nail and intra-articular penetration of the lag screw.  I discussed the risks and benefits of the procedure which include but are not limited to risks of infection, wound healing complication, neurovascular injury, pain, malunion, non-union, malrotation, and the medical risks of anesthesia including MI, stroke, and death.  Alternatives to surgery were also discussed, including non-operative management, which I did not recommend.  The patient was in agreement with the plan to proceed, and the informed consent was signed and documented.  I met with the patient pre-operatively and marked the operative extremity with their agreement.  We proceeded to the operating room.     DESCRIPTION OF PROCEDURE:  Patient was seen in the preoperative holding area on the day of surgery. The operative site was marked with my initials.  she was taken to the operating room and placed lateral on the operative table.  Anesthesia was induced.  The operative extremity was prepped and draped in the normal sterile fashion.  Operative pause was conducted and the correct patient, site, side, procedure, and surgeon's initials on extremity were identified.  Extended posterior lateral approach to the hip was performed through previous incisions.  There is a large hematoma and significant edema present.  Fascia was split in line with skin incision.  The  tip of the implant was identified.  We placed our set screwdriver in place.  We then placed our screwdriver for the lag screw and attach it to the lag screw which was prominent.  We then loosened the set screw and backed the lag screw out.  We then attached our extraction jig to the proximal aspect of the nail.  Once this was done we then remove the distal interlock screw and the nail was removed.  We then completed our posterior approach to the hip.  The greater trochanter is noted to have moderate amount of comminution.  The short external rotators were identified and released with the capsule and contiguously.  These were tagged for later repair.  Femoral neck cut was performed and the femoral head was removed.  This was sized for later implant.  This point time we then used a box osteotome 5 followed by canal finder and initial reamer into position for our planned 115 mm femoral stem.  We then obtained an intraoperative x-ray proving the 115 stem was long enough to bypass the distal interlock screw.  We placed a single cerclage wire distally around the femur for prophylactic fixation prior to reaming.  We then sequentially reamed until significant chatter was obtained at the 19 mm reamer.  This was left in place and x-ray was taken showing no extension of the fracture and adequate canal fill.  We then placed our appropriately sized femoral stem into position.  This had excellent fit around the +10 position.  We then attached the proximal body attachment and sequentially reamed the proximal body up to a size 23 mm reamer.  We then placed our trial body which was a +10 and secured it with appropriate version.  We then trialed several femoral head components and landed on +4 which provided symmetric limb lengths as well as excellent stability.  The hip was then redislocated trial implants were removed.  We then placed our final proximal body and head components.  Hip was read reduced and again noted to be extremely  stable with symmetric limb lengths.  This point time the wound was thoroughly irrigated with Betadine and sterile saline solution.  We placed vancomycin tobramycin powder into the wound.  We then repaired the short external rotators and capsule using 2, #5 Ethibond sutures.  We then repaired the abductor rent followed by the fascia.  Subcutaneous was then closed with 0, 2-0 Vicryl sutures.  Staples were used for skin.  Sterile dressings were applied.  Patient was placed in abduction pillow.  She was awoken taken to PACU stable condition.    POSTOPERATIVE PLAN: Weightbearing as tolerated posterior hip precautions weight bearing.  Mobilize with physical and occupational therapies.  DVT prophylaxis with SCDs and Lovenox until mobilizing independently and then can be switched to aspirin for 4 weeks.  The patient will follow up in clinic in 2 weeks to check wounds and remove sutures/staples.      ____________________________________   Jc Escobar M.D.   DD: 11/7/2023  2:10 PM

## 2023-11-07 NOTE — ED NOTES
Received bedside report from Makenna GLOVER RN; assumed care of Pt.    2 L/min via NC oxygen.  No SI precautions.  High fall risk precautions.  Cardiac monitor in place.    Rounded on Pt. Pt resting comfortably with eyes closed. Respirations regular.  Will continue to monitor.

## 2023-11-07 NOTE — ANESTHESIA PREPROCEDURE EVALUATION
Case: [id number] Date/Time: 11/07/23 1133    Procedures:       REVISION, TOTAL ARTHROPLASTY, HIP (Right)      REMOVAL, HARDWARE (Right: Hip)    Pre-op diagnosis: closed right hip fracture    Location: [facility] OR 16 / SURGERY [facility]    Surgeons: Jc Escobar M.D.          Relevant Problems      (positive) ANETTE (acute kidney injury) (HCC)      ENDO   (positive) Hypothyroidism       Physical Exam    Airway   Mallampati: II  TM distance: >3 FB  Neck ROM: full       Cardiovascular - normal exam  Rhythm: regular  Rate: normal  (-) murmur  Comments: EKG reviewed, last echo 10/23 showed good EF 55% with grade 1 diastolic dystfunction   Dental - normal exam  (+) upper dentures, lower dentures           Pulmonary - normal exam  Breath sounds clear to auscultation     Abdominal    Neurological - normal exam                 Anesthesia Plan    ASA 2       Plan - general         (89 yr old with some demetia, thyroid dx, lives with son, but does well with ADL on her own. Pt getting one unit of blood now prior to OR R//B/A disc with pt and son. )    Plan Factors:   Patient was previously instructed to abstain from smoking on day of procedure.  Patient did not smoke on day of procedure.      Induction: intravenous    Postoperative Plan: Postoperative administration of opioids is intended.    Pertinent diagnostic labs and testing reviewed    Informed Consent:    Anesthetic plan and risks discussed with patient.    Use of blood products discussed with: patient whom consented to blood products.

## 2023-11-07 NOTE — ANESTHESIA PROCEDURE NOTES
Peripheral IV    Date/Time: 11/7/2023 12:24 PM    Performed by: Francie Burnett M.D.  Authorized by: Francie Burnett M.D.    Size:  20 G  Laterality:  Right  Peripheral IV Location:  Wrist  Local Anesthetic:  None  Site Prep:  Alcohol  Technique:  Direct puncture  Attempts:  1

## 2023-11-07 NOTE — ED NOTES
Bedside report received from off going RN/tech: [place], assumed care of patient.  POC discussed with patient. Call light within reach, all needs addressed at this time.     Fall risk interventions in place: Patient's personal possessions are with in their safe reach, Place socks on patient, Place fall risk sign on patient's door, Keep floor surfaces clean and dry, and Accompanied to restroom (all applicable per [place] Fall risk assessment)   Continuous monitoring: Cardiac Leads, Pulse Ox, or Blood Pressure  IVF/IV medications: Infusion per MAR (List Med(s)) LR @ 83/hr  Oxygen: How many liters 2L  Bedside sitter: Not Applicable   Isolation: Not Applicable

## 2023-11-07 NOTE — ED TRIAGE NOTES
Chief Complaint   Patient presents with    Hip Pain     BIB EMS from [place] for post op hip pain. Patient had femur fracture repair on 10/22 and arrives today with an increase in pain.       Per transfer note patient to have surgery redone by Dr. Escobar.

## 2023-11-07 NOTE — H&P
Hospital Medicine History & Physical Note    Date of Service  11/6/2023    Primary Care Physician  Pcp Pt States None    Consultants  orthopedics    Specialist Names: Dr. Escobar    Code Status  DNAR/DNI    Chief Complaint  Chief Complaint   Patient presents with    Hip Pain     BIB EMS from [place] for post op hip pain. Patient had femur fracture repair on 10/22 and arrives today with an increase in pain.        History of Presenting Illness  Kathleen Mendieta is a 89 y.o. female who presented 11/6/2023 with hip pain, postop complication.  Very pleasant woman with history of hypothyroidism, Alzheimer's dementia, recent right intertrochanteric hip fracture on 10/21/2023 after a fall in the bathroom.  She was taken for right femur cephalomedullary nail on 10/22.  Postop course complicated by anemia requiring 1 unit PRBC transfusion, hemoglobin stable since then.  She was discharged to SNF for PT/OT.  She was brought into [facility] after she was noted to have acute worsening of hip pain.  Imaging showed 90 degree angulation of right intertrochanteric femoral neck with penetration of dynamic hip screw through the femoral head penetrating into the superior acetabulum, as well as stable left inferior pubic ramus fracture.  Orthopedics was consulted, Dr. Escobar, will see patient and have discussion with her and family about how to proceed.  At the time of my evaluation the patient denies any symptoms, she reports feeling well, her only complaint is a dry mouth after being NPO.    I discussed the plan of care with patient.    Review of Systems  Review of Systems   Constitutional:  Negative for chills and fever.   Respiratory:  Negative for shortness of breath.    Cardiovascular:  Negative for chest pain and palpitations.   Gastrointestinal:  Negative for abdominal pain.   Musculoskeletal:  Negative for joint pain and neck pain.   Neurological:  Negative for headaches.       Past Medical History   has a  past medical history of Dementia (HCC), Glaucoma, Heart failure (HCC), and Hypothyroidism.    Surgical History   has a past surgical history that includes pr open fix inter/subtroch fx,implnt (Right, 10/22/2023).     Family History  family history is not on file.   Family history reviewed with patient. There is no family history that is pertinent to the chief complaint.     Social History   reports that she has never smoked. She has never used smokeless tobacco.    Allergies  No Known Allergies    Medications  Prior to Admission Medications   Prescriptions Last Dose Informant Patient Reported? Taking?   acetaminophen (TYLENOL) 325 MG Tab   No No   Sig: Take 2 Tablets by mouth every four hours as needed for Mild Pain, Fever or Moderate Pain.   aspirin 81 MG EC tablet   No No   Sig: Take 1 Tablet by mouth every day for 28 days.   citalopram (CELEXA) 10 MG tablet  Family Member Yes No   Sig: Take 10 mg by mouth every evening.   donepezil (ARICEPT) 5 MG Tab  Family Member Yes No   Sig: Take 5 mg by mouth every evening.   levothyroxine (SYNTHROID) 88 MCG Tab  Family Member Yes No   Sig: Take 88 mcg by mouth every evening.   lidocaine (LIDODERM) 5 % Patch   No No   Sig: Place 1 Patch on the skin every 24 hours. Apply to right ribs. Patch(es) may remain in place for up to 12 hours in any 24-hour period. Patient should remain without lidocaine patch for 12 hours to avoid adverse reaction.      Facility-Administered Medications: None       Physical Exam  Temp:  [36.8 °C (98.2 °F)] 36.8 °C (98.2 °F)  Pulse:  [85-96] 85  Resp:  [16-20] 20  BP: (114-142)/(54-70) 124/58  SpO2:  [94 %-99 %] 97 %  Blood Pressure : 130/61   Temperature: 36.8 °C (98.2 °F)   Pulse: 85   Respiration: 16   Pulse Oximetry: 96 %       Physical Exam  Constitutional:       Comments: Frail-appearing woman, no apparent distress   HENT:      Head: Normocephalic and atraumatic.      Nose: Nose normal.      Mouth/Throat:      Mouth: Mucous membranes are dry.     "  Pharynx: Oropharynx is clear.   Eyes:      Extraocular Movements: Extraocular movements intact.      Conjunctiva/sclera: Conjunctivae normal.   Cardiovascular:      Rate and Rhythm: Normal rate and regular rhythm.      Pulses: Normal pulses.      Heart sounds: Normal heart sounds.   Pulmonary:      Effort: Pulmonary effort is normal.      Breath sounds: Normal breath sounds.   Abdominal:      General: Abdomen is flat.      Palpations: Abdomen is soft.   Musculoskeletal:         General: No swelling or tenderness.      Cervical back: Neck supple. No rigidity.   Skin:     General: Skin is warm and dry.      Capillary Refill: Capillary refill takes less than 2 seconds.   Neurological:      Mental Status: Mental status is at baseline.      Comments: Oriented to person, place, events, difficulty with date         Laboratory:  Recent Labs     11/06/23 2114   WBC 8.1   RBC 2.33*   HEMOGLOBIN 7.8*   HEMATOCRIT 25.1*   .7*   MCH 33.5*   MCHC 31.1*   RDW 73.4*   PLATELETCT 440   MPV 9.2     Recent Labs     11/06/23 2114   SODIUM 138   POTASSIUM 4.5   CHLORIDE 103   CO2 26   GLUCOSE 94   BUN 22   CREATININE 1.23   CALCIUM 7.8*     Recent Labs     11/06/23 2114   ALTSGPT 12   ASTSGOT 24   ALKPHOSPHAT 162*   TBILIRUBIN 0.8   GLUCOSE 94     Recent Labs     11/06/23 2114   INR 1.12     No results for input(s): \"NTPROBNP\" in the last 72 hours.      No results for input(s): \"TROPONINT\" in the last 72 hours.    Imaging:  DX-HIP-BILATERAL-WITH PELVIS-2 VIEWS   Final Result         1.  90 degree angulation of right intertrochanteric femoral neck with penetration of dynamic hip screw through the femoral head penetrating into the superior acetabulum.   2.  Left inferior pubic ramus fracture, stable since prior study.            Assessment/Plan:  Justification for Admission Status  I anticipate this patient will require at least two midnights for appropriate medical management, necessitating inpatient admission because hip " fracture with postop complication requiring operative repair,    Patient will need a Med/Surg bed on EMERGENCY service .  The need is secondary to above.    * Closed right hip fracture, sequela- (present on admission)  Assessment & Plan  Hip fracture on 10/21 with operative repair 10/22 with a cephalomedullary nail.  Brought back from SNF due to acute worsening of pain.  Imaging showed 90 degree angulation of femoral neck with penetration of hip screw through the femoral head into the superior acetabulum.  -Orthopedics on board, Dr. Escobar  -N.p.o.  -Patient not complaining of pain at this time, Tylenol as needed, escalate to narcotics if needed however will hold off on ordering for now to avoid delirium in patient with dementia and advanced age, start with low dose if needed  -PT/OT when appropriate      Anemia  Assessment & Plan  Postop blood loss anemia requiring transfusion after operation.  Hemoglobin stable at 7.8  Continue to monitor, check CBC after surgery, transfuse for goal greater than 7    Alzheimer disease (HCC)- (present on admission)  Assessment & Plan  Continue donepezil and citalopram    Hypothyroidism- (present on admission)  Assessment & Plan  Continue levothyroxine        VTE prophylaxis: SCDs/TEDs and heparin ppx

## 2023-11-07 NOTE — OR NURSING
1425: Pt arrived from OR post R hip revision under anesthesia. Pt is asleep. Silver mepilex to R hip is CDI. Cardiac rhythm appears to be SR. Order for HGB.    1457: HGB drawn and sent to lab. Pt hypotensive; HR greater than 90; okayed to give ephedrine by Dr. Burnett. Pt is awake and oriented; has a strong cough.     1530: Pt's BP improving. MD was at bedside; HGB 9.1. Pt denies any needs at this time.     1555: Updated pt's daughter, Margaret.     1650: Report to ASA Jensen.     1705: Pt to floor via rney with transport.

## 2023-11-07 NOTE — ASSESSMENT & PLAN NOTE
Postop blood loss anemia requiring transfusion after operation.  Hemoglobin stable at 7.8  Continue to monitor, check CBC after surgery, transfuse for goal greater than 7    Patient given 1 unit PRBC for anticipated blood loss prior to surgery  Hemoglobin 6.8 today, 1 more unit ordered

## 2023-11-07 NOTE — ED NOTES
Pt transported to pre-op with transporter and RN with patient due to blood infusing. Pt transported without incident. Handoff to pre-op Rns. Pt had chart and all belongings with patient. Blood continued to pre-op.

## 2023-11-07 NOTE — ANESTHESIA PROCEDURE NOTES
Airway    Date/Time: 11/7/2023 12:25 PM    Performed by: Francie Burnett M.D.  Authorized by: Francie Burnett M.D.    Location:  OR  Urgency:  Elective  Indications for Airway Management:  Anesthesia      Spontaneous Ventilation: absent    Sedation Level:  Deep  Preoxygenated: Yes    Patient Position:  Sniffing  Final Airway Type:  Endotracheal airway  Final Endotracheal Airway:  ETT  Cuffed: Yes    Technique Used for Successful ETT Placement:  Direct laryngoscopy    Insertion Site:  Oral  Blade Type:  David  Laryngoscope Blade/Videolaryngoscope Blade Size:  3  ETT Size (mm):  6.5  Measured from:  Teeth  ETT to Teeth (cm):  19  Placement Verified by: auscultation and capnometry    Cormack-Lehane Classification:  Grade I - full view of glottis  Number of Attempts at Approach:  1  Number of Other Approaches Attempted:  0

## 2023-11-07 NOTE — ANESTHESIA POSTPROCEDURE EVALUATION
Patient: Kathleen Mendieta    Procedure Summary     Date: 11/07/23 Room / Location: [place] [address] / SURGERY [facility]    Anesthesia Start: 1215 Anesthesia Stop: 1428    Procedures:       REVISION, TOTAL ARTHROPLASTY, HIP (Right: Hip)      REMOVAL, HARDWARE FEMUR NAIL (Right: Hip) Diagnosis: (closed right hip fracture)    Surgeons: Jc Escobar M.D. Responsible Provider: Francie Burnett M.D.    Anesthesia Type: general ASA Status: 2          Final Anesthesia Type: general  Last vitals  BP   Blood Pressure : 109/53    Temp   37.3 °C (99.1 °F)    Pulse   (Abnormal) 102   Resp   20    SpO2   97 %      Anesthesia Post Evaluation    Patient location during evaluation: PACU  Patient participation: complete - patient participated  Level of consciousness: awake and alert  Pain score: 0    Airway patency: patent  Anesthetic complications: no  Cardiovascular status: hemodynamically stable  Respiratory status: acceptable  Hydration status: euvolemic  Comments: Pt BP slightly low, checked H/H 9.0 pt awake and conversing no complaints     PONV: none          No notable events documented.     Nurse Pain Score: 0 (NPRS)

## 2023-11-07 NOTE — ANESTHESIA TIME REPORT
Anesthesia Start and Stop Event Times     Date Time Event    11/7/2023 11:36 AM Ready for Procedure    11/7/2023 12:15 PM Anesthesia Start    11/7/2023 02:28 PM Anesthesia Stop        Responsible Staff  11/07/23    Name Role Begin End    Francie Burnett M.D. Anesthesiologist 11/07/23 12:15 PM 11/07/23 02:28 PM        Overtime Reason:  no overtime (within assigned shift)    Comments:

## 2023-11-07 NOTE — PROGRESS NOTES
Plan for today for revision to right hip arthroplasty with cephalized nail removal after failed fixation for intertrochanteric fracture.  We discussed the risks of surgery at length with the patient son and daughter-in-law as well as the patient.  This is a much larger operation than previous operation including longer operative time increased risk of blood loss as well as risk of anesthesia.  We discussed the risk of periprosthetic fracture hip dislocation and requirement for further surgeries.  Patient and her family understand and wish to move forward.      Jc Escobar MD  Orthopedic Trauma Surgery

## 2023-11-07 NOTE — ASSESSMENT & PLAN NOTE
As per chest x-ray.  On the right.  Could be due to poor mobilization and hypoinflation.    Respiratory consult, incentive spirometry

## 2023-11-07 NOTE — HOSPITAL COURSE
Kathleen Mendieta is a 89 y.o. female who presented 11/6/2023 with hip pain, postop complication.  Very pleasant woman with history of hypothyroidism, Alzheimer's dementia, recent right intertrochanteric hip fracture on 10/21/2023 after a fall in the bathroom.  She was taken for right femur cephalomedullary nail on 10/22.  Postop course complicated by anemia requiring 1 unit PRBC transfusion, hemoglobin stable since then.  She was discharged to SNF for PT/OT.  She was brought into [facility] after she was noted to have acute worsening of hip pain.  Imaging showed 90 degree angulation of right intertrochanteric femoral neck with penetration of dynamic hip screw through the femoral head penetrating into the superior acetabulum, as well as stable left inferior pubic ramus fracture.  Orthopedics was consulted, Dr. Escobar, will see patient and have discussion with her and family about how to proceed.  At the time of my evaluation the patient denies any symptoms, she reports feeling well, her only complaint is a dry mouth after being NPO.

## 2023-11-07 NOTE — ASSESSMENT & PLAN NOTE
Hip fracture on 10/21 with operative repair 10/22 with a cephalomedullary nail.  Brought back from SNF due to acute worsening of pain.  Imaging showed 90 degree angulation of femoral neck with penetration of hip screw through the femoral head into the superior acetabulum.  -Orthopedics on board, Dr. Escobar  -Patient not complaining of pain at this time, Tylenol as needed, escalate to narcotics if needed however will hold off on ordering for now to avoid delirium in patient with dementia and advanced age, start with low dose if needed  -PT/OT when appropriate    Postop day 1  Therapy recommending postacute placement

## 2023-11-07 NOTE — ED NOTES
Bedside report to ASA [place]. Plan of care discussed with patient. Bed locked and in lowest position. Call light available and within reach. Appropriate fall precautions in place. No distress noted. This RN removed from care.

## 2023-11-07 NOTE — ED NOTES
Blood continuing to infuse, pt continually needing reminding to keep arm straight therefore blood infusing slowly. No reaction s/sx noted. Pre-op sending for transport. Ok to continue blood there per RN

## 2023-11-07 NOTE — ED NOTES
Bedside report from ASA [place]    Pt is resting in bed. Call light within reach.   Gurney in low position. Side rails up.     Cardiac monitor: Yes  Pulse ox: Yes  Oxygen: 2lpm via NC connected to wall  Fall risk: Moderate (Pt is A&Ox4. GCS 15)  Bed alarm: No ((Pt is A&Ox4. GCS 15))  PIV: G20 Left AC patent and intact

## 2023-11-07 NOTE — ED NOTES
Report called to pre-op RN. Son stated that surgeon called him a little bit ago and agreed to move forward with surgery. Per pre-op, will send for patient in a few hours. Family and patient updated on POC. Call light within reach

## 2023-11-07 NOTE — PROGRESS NOTES
89F right intertroch failure s/p short hip nail  Screw cut out noted on OSH films  Plan to admit to hospital for pain control optimization for possible OR tomorrow  Will need to have discussion with family regarding plan of care  Surgery would include revision to hip replacement vs non op comfort care  NPO at mn for possible OR tomorrow should family choose surgery  Moderate risks for larger revision surgery      Jc Escobar MD  Orthopedic Trauma Surgery

## 2023-11-07 NOTE — PROGRESS NOTES
[facility] DIRECT ADMISSION REPORT  Transferring facility: ***  Transferring physician: ***    Chief complaint: ***  Pertinent history & patient course:   Femoral neck fracture Dr. Escobar on 10/22 at [facility]  Back to [facility] wbat complained increase paib xr show she has a screw extending through sup fem head sand to supraacetabular pirtion r ilium  Dr. Escobar, recommend transfer to [facility] er  Hb 7.8   Pertinent imaging & lab results: ***  Consultants called prior to transfer and pertinent input from consultants: ***  Code Status: *** per transferring provider, I personally verified with the transferring provider patient's code status and the transferring provider has confirmed this with the patient.  Reason for Transfer: ***  Further work up or recommendations requested prior to transfer: ***    Patient accepted for transfer: {YES/NO:20266}  Accepting [facility] Facility: {Accepting Facility:[id number]}    Consultants to be called upon arrival: ***  Admission status: {ER TRIAGE DIRECT ADMISSION STATUS:16627}.   Floor requested: ***  If ICU transfer, name of intensivist case discussed with and pertinent input from critical care: ***    The admitting provider is the point of contact for questions or concerns regarding patient's care.

## 2023-11-07 NOTE — ED NOTES
Assist RN: Went to medicate pt for pain, pt reporting no pain at this time. Given a couple warm blankets for comfort. Call light within reach. VSS at this time

## 2023-11-07 NOTE — ASSESSMENT & PLAN NOTE
Patient was hypoxic down to 87% on room air now requiring 2 LPM oxygen by nasal count.     Chest x-ray per my review, shows elevated right hemidiaphragm with hyperinflation.  Sharp costophrenic angles with no focal consolidation.    Continue oxygen as per respiratory protocol.  Continuous pulse oximetry.  Respiratory therapy consult.  Duo nebs every 4 hours and as needed.  Incentive spirometry.     Spoke with mom- appointment scheduled.

## 2023-11-08 NOTE — DISCHARGE PLANNING
Received Choice form at [id number]  Agency/Facility Name: [facility]  Referral sent per Choice form @ 0214

## 2023-11-08 NOTE — PROGRESS NOTES
Assumed care of patient after receiving report from Edward night shift RN. Patient is currently Alert and Oriented x4 on 1.5L NC. Bed locked and in lowest position. Call light within reach.

## 2023-11-08 NOTE — PROGRESS NOTES
4 Eyes Skin Assessment Completed by ASA Humphries and Beth RN.    Head WDL  Ears WDL  Nose WDL  Mouth WDL  Neck WDL  Breast/Chest WDL  Shoulder Blades WDL  Spine bruising to right lower back  (R) Arm/Elbow/Hand Bruising  (L) Arm/Elbow/Hand Bruising  Abdomen WDL  Groin Bruising to groin and vagina  Scrotum/Coccyx/Buttocks Redness and Blanching  (R) Leg surgical incision to right hip, with silver Mepilex dressing on, bruising to right hip  (L) Leg WDL  (R) Heel/Foot/Toe WDL, Heel Mepilex applied to right heel  (L) Heel/Foot/Toe WDL, Heel Mepilex applied to left heel          Devices In Places Blood Pressure Cuff, Pulse Ox, Britton, Leg Immobilizer, and Nasal Cannula      Interventions In Place NC W/Ear Foams, Heel Mepilex, Waffle Overlay, Pillows, and Low Air Loss Mattress    Possible Skin Injury Yes    Pictures Uploaded Into Epic N/A  Wound Consult Placed N/A  RN Wound Prevention Protocol Ordered No

## 2023-11-08 NOTE — THERAPY
"Occupational Therapy   Initial Evaluation     Patient Name: Kathleen Mendieta  Age:  89 y.o., Sex:  female  Medical Record #: [id number]  Today's Date: 11/8/2023     Precautions  Precautions: (P) Fall Risk, Swallow Precautions, Posterior Hip Precautions, Weight Bearing As Tolerated Right Lower Extremity    Assessment  Patient is 89 y.o. female who presents to acute s/p right proximal femur to hip arthroplasty. Pt required max A for supine<>sit, Max A for LB dressing, min A seated grooming, mod A for STS w/ FWW. Pt c/o dizziness and nausea after standing, noted 10 point drop in BP. Pt demo'd impaired balance, functional mobility, and activity tolerance impacting functional independence. Will continue to follow.     Plan    Occupational Therapy Initial Treatment Plan   Treatment Interventions: (P) Self Care / Activities of Daily Living, Neuro Re-Education / Balance, Therapeutic Exercises, Therapeutic Activity, Adaptive Equipment  Treatment Frequency: (P) 3 Times per Week  Duration: (P) Until Therapy Goals Met    DC Equipment Recommendations: (P) Unable to determine at this time  Discharge Recommendations: (P) Recommend post-acute placement for additional occupational therapy services prior to discharge home     Subjective    \"I have decided I am not going to walk today\"     Objective       11/08/23 0915   Charge Group   OT Evaluation OT Evaluation Mod   Total Time Spent   OT Evaluation (Minutes) 30   Initial Contact Note    Initial Contact Note Order Received and Verified, Occupational Therapy Evaluation in Progress with Full Report to Follow.   Prior Living Situation   Prior Services Intermittent Physical Support for ADL Per Family   Housing / Facility Mobile Home   Bathroom Set up Walk In Shower;Shower Chair   Equipment Owned   (3WW)   Lives with - Patient's Self Care Capacity Adult Children   Comments Pts son present, very supportive, reports her dtr assists her with bathing as needed   Prior Level of ADL Function   Self " Feeding Independent   Grooming / Hygiene Independent   Bathing Requires Assist   Dressing Requires Assist   Toileting Independent   Precautions   Precautions Fall Risk;Swallow Precautions;Posterior Hip Precautions;Weight Bearing As Tolerated Right Lower Extremity   Vitals   O2 (LPM) 1.5   O2 Delivery Device Nasal Cannula   Vitals Comments Pt orthostatic during session, first time OOB since sx, BP dropped from 100s/80s, to 80s/60s , pt symptomatic, RN aware   Pain 0 - 10 Group   Therapist Pain Assessment Post Activity Pain Same as Prior to Activity;Nurse Notified  (c/o hip pain, agreeable to session)   Cognition    Cognition / Consciousness X   Speech/ Communication Hard of Hearing   Level of Consciousness Alert   Comments pleasent and cooperative,   Active ROM Upper Body   Active ROM Upper Body  WDL   Strength Upper Body   Comments WFL   Coordination Upper Body   Coordination WDL   Balance Assessment   Sitting Balance (Static) Fair -   Sitting Balance (Dynamic) Poor +   Standing Balance (Static) Poor +   Standing Balance (Dynamic) Poor   Weight Shift Sitting Fair   Weight Shift Standing Poor   Comments w/ FWW   Bed Mobility    Supine to Sit Maximal Assist   Sit to Supine Maximal Assist   Scooting Moderate Assist   ADL Assessment   Grooming Minimal Assist;Seated   Upper Body Dressing Moderate Assist   Lower Body Dressing Maximal Assist   How much help from another person does the patient currently need...   Putting on and taking off regular lower body clothing? 1   Bathing (including washing, rinsing, and drying)? 1   Toileting, which includes using a toilet, bedpan, or urinal? 1   Putting on and taking off regular upper body clothing? 2   Taking care of personal grooming such as brushing teeth? 3   Eating meals? 3   6 Clicks Daily Activity Score 11   Functional Mobility   Sit to Stand Moderate Assist   Mobility stood EOB w/ FWW   Activity Tolerance   Sitting Edge of Bed 10 min   Standing 1 min   Patient / Family  Goals   Patient / Family Goal #1 To go back to bed   Short Term Goals   Short Term Goal # 1 Pt will demo LB dressing using AE w/ min A   Short Term Goal # 2 Pt will demo standing grooming w/ SPV   Short Term Goal # 3 Pt will demo toilet txf w/ min A   Education Group   Role of Occupational Therapist Patient Response Patient;Family;Acceptance;Explanation;Demonstration;Reinforcement Needed   Hip Precautions Patient Response Patient;Family;Acceptance;Explanation;Demonstration;Reinforcement Needed   Weight Bearing Precautions Patient Response Patient;Acceptance;Explanation;Demonstration;Reinforcement Needed;Family   Occupational Therapy Initial Treatment Plan    Treatment Interventions Self Care / Activities of Daily Living;Neuro Re-Education / Balance;Therapeutic Exercises;Therapeutic Activity;Adaptive Equipment   Treatment Frequency 3 Times per Week   Duration Until Therapy Goals Met   Problem List   Problem List Decreased Active Daily Living Skills;Decreased Homemaking Skills;Decreased Functional Mobility;Decreased Activity Tolerance;Impaired Postural Control / Balance   Anticipated Discharge Equipment and Recommendations   DC Equipment Recommendations Unable to determine at this time   Discharge Recommendations Recommend post-acute placement for additional occupational therapy services prior to discharge home   Interdisciplinary Plan of Care Collaboration   IDT Collaboration with  Nursing;Family / Caregiver   Patient Position at End of Therapy In Bed;Bed Alarm On;Tray Table within Reach;Call Light within Reach;Phone within Reach   Collaboration Comments report given   Session Information   Date / Session Number  11/8, 1 (1/3, 11/14)

## 2023-11-08 NOTE — PROGRESS NOTES
2227: Notified DANIEL Stratton via Voalte that patient's heart rate sustained at 120-130 bpm since her right hip surgery today at 1700 despite pain medications given.     2345: 500 ml of fluid bolus ordered and given.    0244: Notified DANIEL Stratton that patient's heart rate still at 120-130 bpm after fluid bolus and patient's hemoglobin 7.5 at 0100.    0351: STAT EKG ordered and image sent to DANIEL Stratton. Order to transfer patient to Tele floor by DANIEL Stratton.    0412: report given to ASA Leon. Patient transferred to Tele 834 with all her personal belongings.

## 2023-11-08 NOTE — PROGRESS NOTES
NOC HOSPITALIST CROSS COVER    Notified by RN regarding patient having tachycardia.  Order placed for 500 cc LR bolus given the patient underwent surgery today.  Despite patient receiving bolus she remains tachycardic.  EKG was ordered that demonstrated A-fib with RVR.  Order placed for transfer to telemetry floor as patient does not appear to have history of atrial fibrillation.  Orders also placed for Lopressor 3 times as needed for heart rate sustained greater than 120.            A/P:  #New onset A-fib with RVR  -Transfer to telemetry  -As needed Lopressor for heart rate sustained greater than 120  -IV fluids  -Patient remains hemodynamically stable        -----------------------------------------------------------------------------------------------------------    Electronically signed by:  AARON Govea PA-C  Hospitalist Services

## 2023-11-08 NOTE — PROGRESS NOTES
4 Eyes Skin Assessment Completed by ASA Leon and ASA Mason.    Head Left side discoloration  Ears WDL  Nose WDL  Mouth WDL  Neck WDL  Breast/Chest Redness and Blanching  Shoulder Blades WDL  Spine WDL  (R) Arm/Elbow/Hand Redness, Blanching, and Bruising  (L) Arm/Elbow/Hand Redness, Blanching, and Bruising  Abdomen Bruising  Groin Redness and Blanching  Scrotum/Coccyx/Buttocks WDL  (R) Leg Scar, Bruising, and Incision, R Hip surgical site covered with wound dressing and clear adhesive  (L) Leg Bruising  (R) Heel/Foot/Toe Redness, Blanching, and Boggy  (L) Heel/Foot/Toe Redness, Blanching, and Boggy          Devices In Places Tele Box, Blood Pressure Cuff, Pulse Ox, SCD's, and Nasal Cannula      Interventions In Place NC W/Ear Foams, Heel Mepilex, Waffle Overlay, TAP System, Pillows, and Q2 Turns    Possible Skin Injury No    Pictures Uploaded Into Epic N/A  Wound Consult Placed N/A  RN Wound Prevention Protocol Ordered No

## 2023-11-08 NOTE — PROGRESS NOTES
"Ortho Progress Note    Subjective:  S/p 11/7/23: Conversion right proximal femur to hip arthroplasty    Seen in T834. Son at bedside. Hgb 7.1. Pain increased at this time. Discussed post-op plan. PT pending         /49   Pulse 98   Temp 36.3 °C (97.3 °F) (Temporal)   Resp 16   Ht 1.549 m (5' 1\")   Wt 66 kg (145 lb 8.1 oz)   SpO2 93%     Patient seen and examined  No acute distress  Breathing non labored  RRR  RLE: Surgical dressings intact. Motor and sensory exam intact. Cap refill brisk    Recent Labs     11/06/23  2114 11/07/23  1455 11/07/23  1800 11/08/23  0046 11/08/23  0614   WBC 8.1  --   --   --   --    RBC 2.33*  --   --   --   --    HEMOGLOBIN 7.8*   < > 9.1* 7.5* 7.1*   HEMATOCRIT 25.1*  --  28.4* 25.7* 22.6*   .7*  --   --   --   --    MCH 33.5*  --   --   --   --    MCHC 31.1*  --   --   --   --    RDW 73.4*  --   --   --   --    PLATELETCT 440  --   --   --   --    MPV 9.2  --   --   --   --     < > = values in this interval not displayed.       Active Hospital Problems    Diagnosis     Elevated hemidiaphragm [J98.6]     Closed right hip fracture, sequela [S72.001S]     Acute blood loss anemia (ABLA) [D62]     Acute respiratory failure with hypoxia (HCC) [J96.01]     Alzheimer disease (HCC) [G30.9, F02.80]     Hypothyroidism [E03.9]        Assessment/Plan:      Failed fixation right IT femur fracture  S/p 11/7/23: Conversion right proximal femur to hip arthroplasty    Wt bearing status - WBAT  PT/OT-initiated  Wound care: Dressings to stay until follow up. Ok to shower with dressings in place.  Sutures/Staples out- 10-14 days post operatively  Antibiotics: post-op abx complete  DVT Prophylaxis- TEDS/SCDs/Foot pumps.   Chemo ppx ok from ortho standpoint, Heparin ordered    Future Procedures - none  Case Coordination for Discharge Planning - Disposition: dispo planning and outpatient follow up      Nikita Barnes MD  Ortho Trauma Fellow   [phone] or voalte   "

## 2023-11-08 NOTE — PROGRESS NOTES
[facility] Medicine Daily Progress Note    Date of Service  11/8/2023    Chief Complaint  Kathleen Mendieta is a 89 y.o. female admitted 11/6/2023 with right hip pain.    Hospital Course  Kathleen Mendieta is a 89 y.o. female who presented 11/6/2023 with hip pain, postop complication.  Very pleasant woman with history of hypothyroidism, Alzheimer's dementia, recent right intertrochanteric hip fracture on 10/21/2023 after a fall in the bathroom.  She was taken for right femur cephalomedullary nail on 10/22.  Postop course complicated by anemia requiring 1 unit PRBC transfusion, hemoglobin stable since then.  She was discharged to SNF for PT/OT.  She was brought into [facility] after she was noted to have acute worsening of hip pain.  Imaging showed 90 degree angulation of right intertrochanteric femoral neck with penetration of dynamic hip screw through the femoral head penetrating into the superior acetabulum, as well as stable left inferior pubic ramus fracture.  Orthopedics was consulted, Dr. Escobar, will see patient and have discussion with her and family about how to proceed.  At the time of my evaluation the patient denies any symptoms, she reports feeling well, her only complaint is a dry mouth after being NPO.    Interval Problem Update  11/07/23  Patient was seen and examined in the emergency room.  Spoke with the patient at bedside with the son.  Patient had increasing right hip pain following her physical therapy sessions at the skilled nursing facility.  Found to have problem with the right hip fracture repair.  Patient is alert and orient x2.  Complaining of some right hip pain currently controlled.  Currently on 2 LPM oxygen by nasal cannula.  Awaiting for surgery today by Dr. Escobar.    11/8 postop day 1 conversion right proximal femur to hip arthroplasty.  Overnight patient went into atrial fibrillation with RVR transferred to telemetry.  Heart rate improved this morning after IV metoprolol and fluid  resuscitation.  Start lowest dose oral metoprolol twice daily.  Check renal panel and electrolytes.  Hemoglobin 7.1 and then 6.8, 1 unit PRBC ordered likely just postop anemia no signs of bleeding.  Sodium 133, creatinine increased 1.57.  Patient's main complaint was that she was hungry as she has not eaten in 2 days.  Discussed risks of atrial fibrillation and recommendation for anticoagulation for stroke prevention versus riskof bleeding with her falls.  Patient and her son would like to hold off on blood thinner for now.  Therapy recommending postacute placement.  Patient and her son are both very anxious to discharge to rehab, if labs are stable in the morning anticipate she can DC.    I have discussed this patient's plan of care and discharge plan at IDT rounds today with Case Management, Nursing, Nursing leadership, and other members of the IDT team.    Consultants/Specialty  orthopedics    Code Status  DNAR/DNI    Disposition  The patient is not medically cleared for discharge to home or a post-acute facility.  Anticipate discharge to: skilled nursing facility    I have placed the appropriate orders for post-discharge needs.    Review of Systems  Review of Systems   Constitutional:  Negative for chills and fever.   Respiratory:  Negative for cough and shortness of breath.    Cardiovascular:  Negative for chest pain and palpitations.   Gastrointestinal:  Negative for abdominal pain, nausea and vomiting.   Genitourinary:  Negative for dysuria and hematuria.   Musculoskeletal:  Positive for joint pain. Negative for falls and myalgias.   Neurological:  Negative for dizziness and headaches.        Physical Exam  Temp:  [36.3 °C (97.3 °F)-37 °C (98.6 °F)] 37 °C (98.6 °F)  Pulse:  [] 92  Resp:  [16-18] 18  BP: ()/(44-90) 116/49  SpO2:  [92 %-98 %] 98 %    Physical Exam  Vitals and nursing note reviewed.   Constitutional:       General: She is not in acute distress.     Comments: Son at bedside   HENT:       Head: Normocephalic and atraumatic.   Eyes:      General:         Right eye: No discharge.         Left eye: No discharge.      Conjunctiva/sclera: Conjunctivae normal.   Cardiovascular:      Rate and Rhythm: Normal rate and regular rhythm.   Pulmonary:      Effort: Pulmonary effort is normal. No respiratory distress.      Breath sounds: Normal breath sounds. No wheezing.      Comments: On nasal cannula  Diminished breath sounds in the bases  Abdominal:      General: Abdomen is flat. There is no distension.      Palpations: Abdomen is soft.      Tenderness: There is no abdominal tenderness.   Musculoskeletal:         General: Tenderness present.      Cervical back: Neck supple. No tenderness.      Right lower leg: No edema.      Left lower leg: No edema.      Comments: RLE surgical dressing intact   Neurological:      Mental Status: She is alert. She is disoriented.      Sensory: No sensory deficit.      Motor: No weakness.      Comments: Alert and orient x2   Psychiatric:         Mood and Affect: Mood normal.         Fluids    Intake/Output Summary (Last 24 hours) at 11/8/2023 1752  Last data filed at 11/8/2023 1730  Gross per 24 hour   Intake 406.25 ml   Output 450 ml   Net -43.75 ml       Laboratory  Recent Labs     11/06/23 2114 11/07/23  1455 11/08/23  0046 11/08/23  0614 11/08/23  1211   WBC 8.1  --   --   --   --    RBC 2.33*  --   --   --   --    HEMOGLOBIN 7.8*   < > 7.5* 7.1* 6.8*   HEMATOCRIT 25.1*   < > 25.7* 22.6* 20.8*   .7*  --   --   --   --    MCH 33.5*  --   --   --   --    MCHC 31.1*  --   --   --   --    RDW 73.4*  --   --   --   --    PLATELETCT 440  --   --   --   --    MPV 9.2  --   --   --   --     < > = values in this interval not displayed.     Recent Labs     11/06/23 2114 11/08/23  1211   SODIUM 138 133*   POTASSIUM 4.5 5.1   CHLORIDE 103 100   CO2 26 23   GLUCOSE 94 119*   BUN 22 26*   CREATININE 1.23 1.57*   CALCIUM 7.8* 7.2*     Recent Labs     11/06/23 2114   INR 1.12                Imaging  DX-FEMUR-1 VIEW RIGHT   Final Result         1.  Postoperative changes of right total hip arthroplasty with bony fragments along the posterior surgical site.   2.  Atherosclerosis      DX-PELVIS-1 OR 2 VIEWS   Final Result         1.  Postoperative changes of right total hip arthroplasty with bony fragments along the posterior surgical site.   2.  Left superior and inferior pubic rami fractures, age indeterminant.   3.  Atherosclerosis      DX-PORTABLE FLUORO > 1 HOUR   Final Result      Portable fluoroscopy utilized for 9 seconds.      INTERPRETING LOCATION: [address], [place], [address]      DX-HIP-UNILATERAL-W/O PELVIS-2/3 VIEWS RIGHT   Final Result      Digitized intraoperative radiograph is submitted for review. This examination is not for diagnostic purpose but for guidance during a surgical procedure. Please see the patient's chart for full procedural details.         INTERPRETING LOCATION: [address], [place], [address]      DX-CHEST-PORTABLE (1 VIEW)   Final Result      1.  Persistently enlarged cardiac silhouette   2.  Bibasilar underinflation atelectasis which could obscure an additional process.      DX-HIP-BILATERAL-WITH PELVIS-2 VIEWS   Final Result         1.  90 degree angulation of right intertrochanteric femoral neck with penetration of dynamic hip screw through the femoral head penetrating into the superior acetabulum.   2.  Left inferior pubic ramus fracture, stable since prior study.           Assessment/Plan  * Closed right hip fracture, sequela- (present on admission)  Assessment & Plan  Hip fracture on 10/21 with operative repair 10/22 with a cephalomedullary nail.  Brought back from SNF due to acute worsening of pain.  Imaging showed 90 degree angulation of femoral neck with penetration of hip screw through the femoral head into the superior acetabulum.  -Orthopedics on board, Dr. Escobar  -Patient not complaining of pain at this time, Tylenol as needed, escalate to narcotics  if needed however will hold off on ordering for now to avoid delirium in patient with dementia and advanced age, start with low dose if needed  -PT/OT when appropriate    Postop day 1  Therapy recommending postacute placement    Elevated hemidiaphragm- (present on admission)  Assessment & Plan  As per chest x-ray.  On the right.  Could be due to poor mobilization and hypoinflation.    Respiratory consult, incentive spirometry    Acute blood loss anemia (ABLA)- (present on admission)  Assessment & Plan  Postop blood loss anemia requiring transfusion after operation.  Hemoglobin stable at 7.8  Continue to monitor, check CBC after surgery, transfuse for goal greater than 7    Patient given 1 unit PRBC for anticipated blood loss prior to surgery  Hemoglobin 6.8 today, 1 more unit ordered    Acute respiratory failure with hypoxia (HCC)- (present on admission)  Assessment & Plan  Patient was hypoxic down to 87% on room air now requiring 2 LPM oxygen by nasal count.     Chest x-ray per my review, shows elevated right hemidiaphragm with hyperinflation.  Sharp costophrenic angles with no focal consolidation.    Continue oxygen as per respiratory protocol.  Continuous pulse oximetry.  Respiratory therapy consult.  Duo nebs every 4 hours and as needed.  Incentive spirometry.      Alzheimer disease (HCC)- (present on admission)  Assessment & Plan  Continue donepezil and citalopram    Hypothyroidism- (present on admission)  Assessment & Plan  Continue levothyroxine    ANETTE (acute kidney injury) (HCC)- (present on admission)  Assessment & Plan  Likely has baseline CKD, mild ANETTE today likely from hypotension and blood loss from surgery  Given IV fluids overnight, pending 1 unit PRBC transfusion today  Repeat BMP in a.m.         VTE prophylaxis: Heparin sc    I have performed a physical exam and reviewed and updated ROS and Plan today (11/8/2023). In review of yesterday's note (11/7/2023), there are no changes except as documented  above.

## 2023-11-08 NOTE — CARE PLAN
The patient is Stable - Low risk of patient condition declining or worsening    Shift Goals  Clinical Goals: pain management, heart rate under control  Patient Goals: pain contro, rest  Family Goals: n/a    Progress made toward(s) clinical / shift goals:    Problem: Knowledge Deficit - Standard  Goal: Patient and family/care givers will demonstrate understanding of plan of care, disease process/condition, diagnostic tests and medications  Outcome: Progressing     Problem: Skin Integrity  Goal: Skin integrity is maintained or improved  Outcome: Progressing     Problem: Fall Risk  Goal: Patient will remain free from falls  Outcome: Progressing     Problem: Pain - Standard  Goal: Alleviation of pain or a reduction in pain to the patient’s comfort goal  Outcome: Progressing       Patient is not progressing towards the following goals: patient's heart rate sustained at 120-130 bpm despite pain medications and fluid bolus given.

## 2023-11-08 NOTE — DISCHARGE PLANNING
DC Transport Scheduled    Received request at: 11/8/2023 at 1215    Transport Company Scheduled:  [employer]  Spoke with Von at [facility] to schedule transport.  [facility] Trip #: [id number]     Scheduled Date: 11/9/2023  Scheduled Time: TBD     Destination:[facility] Bed at [address]     Notified care team of scheduled transport via Voalte.     If there are any changes needed to the DC transportation scheduled, please contact [facility] at ext. [phone] between the hours of 8210-1677 Mon-Fri. If outside those hours, contact the ED Case Manager at ext. [phone].

## 2023-11-08 NOTE — DISCHARGE PLANNING
Care Transition Team Assessment    CM reviewed chart and participated in IDT rounds.  CM met with patient and son at bedside.  Pt is AOX4, [place].  She lives with her son and his wife.  The daughter in law is at home all day with her and assures meals are taken.  She does not leave the house without her son and her [place], she [place].  She uses a walker at times at home.  Is independent with all ADLs.  Son does note that she has been sleeping more lately.  Kathleen tells me that she absolutely wants to go back to [facility] bed, she says they take good care of her but ultimately she would like to get good enough to go back to live with her son.  Her son is very supportive of this.      Information Source  Orientation Level: Oriented X4  Information Given By: Patient  Informant's Name: Kathleen  Who is responsible for making decisions for patient? : Patient    Readmission Evaluation  Is this a readmission?: Yes - unplanned readmission  Why do you think you were readmitted?: Broken hip    Elopement Risk  Legal Hold: No  Ambulatory or Self Mobile in Wheelchair: No-Not an Elopement Risk  Elopement Risk: Not at Risk for Elopement    Interdisciplinary Discharge Planning  Lives with - Patient's Self Care Capacity: Adult Children  Housing / Facility: Mobile Home  Patient Prefers to be Discharged to:: [facility]                   Vision / Hearing Impairment  Right Eye Vision: Impaired  Left Eye Vision: Impaired  Hearing Impairment: Both Ears, Hearing Device Not Available              Domestic Abuse  Have you ever been the victim of abuse or violence?: No    Psychological Assessment  History of Substance Abuse: None         Anticipated Discharge Information  Discharge Disposition: D/T to critical access [facility] () (Swing Bed)

## 2023-11-08 NOTE — THERAPY
"Speech Language Pathology   Clinical Swallow Evaluation     Patient Name: Kathleen Mendieta  AGE:  89 y.o., SEX:  female  Medical Record #: [id number]  Date of Service: 11/8/2023      History of Present Illness  89 y.o. female who presented on 11/6 with hip pain, postop complication. S/p conversion R proximal femur to hip arthroplasty 11/7    Previously by SLP, MBSS on 10/23//2023 found to have mild-moderate oropharyngeal dysphagia. Discharged on SB6/TN0.    PMHx: Hypothyroidism, alzheimer's dementia,right intertrochanteric hip fracture s/p R femur cephalomdeullary nail     CXR:  1.  Persistently enlarged cardiac silhouette  2.  Bibasilar underinflation atelectasis which could obscure an additional process.      General Information:  Vitals  O2 (LPM): 1.5  O2 Delivery Device: Silicone Nasal Cannula  Level of Consciousness: Alert, Awake  Orientation: Self, General place  Follows Directives: Yes      Prior Living Situation & Level of Function:  Housing / Facility: Mobile Home  Lives with - Patient's Self Care Capacity: Adult Children  Communication: Hx dementia  Swallowing: Hx of mild-moderate oropharyngeal dysphagia and suspected esophageal dysphagia per MBSS (10/23/23)       Oral Mechanism Evaluation:  Dentition: Edentulous   Facial Symmetry: Equal  Labial Observations: WFL   Lingual Observations: Midline  Motor Speech: WFL            Laryngeal Function:  Secretion Management: Adequate  Voice Quality: WFL  Cough: Perceptually WNL         Subjective  RN cleared patient for clinical swallow evaluation. Pt received awake, alert, upright in bed, son at bedside. Son reported pt's swallow is \"the same as the last time you guys saw her.\" Pt receiving speech therapy targeting dysphagia during recent admission. Pt agreeable to PO trials.        Assessment  Current Method of Nutrition: NPO until cleared by speech pathology  Positioning: Shi's (60-90 degrees)  Bolus Administration: SLP, Patient  O2 (LPM): 1.5 O2 Delivery Device: " "Silicone Nasal Cannula  Factor(s) Affecting Performance: None  Tracheostomy : No      Swallowing Trials:  Swallowing Trials  Ice: WFL  Liquidised (LQ3): WFL  Soft & Bite Sized (SB6): WFL      Comments: Pt required assistance with feeding secondary to weakness. Demo'd adequate oral bolus acceptance, labial assimilation to feeding tools, and oral bolus containment. Presumed complete AP transfer with no oral bolus residue. Slightly prolonged mastication of soft solids, pt stating \"I think I am just a bit nervous.\" Intermittent throat clear appreciated across trials, consistent with findings of recent MBSS. Vocal quality remained stable throughout oral intake. One-two swallows completed per bolus; however, pt benefited from moderate cues for multiple swallows per bolus. Provided education regarding general aspiration and reflux precautions as well as signs of aspiration, pt and son stated understanding.         Clinical Impressions  Patient presentation this date consistent with results of recent MBSS (10/23/23), with mild-moderate oropharyngeal dysphagia. Recommend oral diet of soft&bite sized and thin liquids. Service to follow for swallow rehabilitation per recent diagnostic study results. Will consider a repeat instrumental swallow study with any changes or concerns.         Recommendations  Diet Consistency: Soft and bite sized solids and thin liquids  Instrumentation: Instrumental swallow study pending clinical progress  Medication: Crush with applesauce, as appropriate, Crush with pudding/puree, as appropriate  Supervision: 1:1 feeding with constant supervision, Careful 1:1 hand feeding  Positioning: Fully upright and midline during oral intake, Remain upright for 30-45 minutes after oral intake, Meals sitting upright in a chair, as tolerated  Risk Management : Small bites/sips, Alternate bites and sips, Slow rate of intake (Swallow x2 per bite)  Oral Care: BID  Consult Referral(s): Gastroenterologist        SLP " "Treatment Plan  Treatment Plan: Dysphagia Treatment  SLP Frequency: 3x Per Week  Estimated Duration: Until Therapy Goals Met      Anticipated Discharge Needs  Discharge Recommendations: Recommend post-acute placement for additional speech therapy services prior to discharge home   Therapy Recommendations Upon DC: Dysphagia Training, Patient / Family / Caregiver Education        Patient / Family Goals  Patient / Family Goal #1: \"My mouth is so dry\"  Short Term Goals  Short Term Goal # 1: Pt will consume a diet of SB6/TN0 without any overt s/sx of aspiration or decline in respiratory status  Short Term Goal # 2: Per results of recent MBSS, Pt will complete exercises targeting BOT retraction, pharyngeal shortening, and LVC x30 in a session.      Emanuel Jimenez, SLP   "

## 2023-11-09 NOTE — DISCHARGE PLANNING
Case Management Discharge Planning    Admission Date: 11/6/2023  GMLOS: 7  ALOS: 3    6-Clicks ADL Score: 10  6-Clicks Mobility Score: 13  PT and/or OT Eval ordered: Yes  Post-acute Referrals Ordered: No  Post-acute Choice Obtained: Yes  Has referral(s) been sent to post-acute provider:  Yes      Anticipated Discharge Dispo: Discharge Disposition: D/T to [facility] access [facility] () (Swing Bed)  CM reviewed chart and participated in IDT rounds, met with patient and son at bedside.  Son states that Orthopedics came to bedside, removed the dressing, assessed the wound and redressed it.  They told the son it would not affect the transfer back to [place].    DME Needed: No    Action(s) Taken: Updated Provider/Nurse on Discharge Plan    Escalations Completed: None    Medically Clear: Yes    Next Steps: [facility] to  by bea    Barriers to Discharge: None

## 2023-11-09 NOTE — THERAPY
"Speech Language Pathology   Daily Treatment     Patient Name: Kathleen Mendieta  AGE:  89 y.o., SEX:  female  Medical Record #: [id number]  Date of Service: 11/9/2023      Precautions:  Precautions: Fall Risk, Swallow Precautions, Posterior Hip Precautions, Weight Bearing As Tolerated Right Lower Extremity         Subjective  RN cleared patient for dysphagia management. Pt received awake, alert, upright in bed. Persistent throat clear appreciated prior to oral intake, pt endorsed d/t mucous in throat.       Assessment  PO trials of thin liquids via cup presented. Pt able to bring cup to oral cavity without difficulty. Adequate oral acceptance and containment. Initial 2 trials of thin liquids, no cough/throat clear appreciated. Pt endorsed \"man that water is good.\" On the next trial pt consumed consecutive large sips, resulting in immediate weak cough response with pt desatting to 79; however pt recovered with O2 returning to 94. Pt expressed, \"I think I took too big of a sip.\" Following cough response, persistent cough and throat clear appreciated with expectoration of mucus. Pt reported feeling mucous stuck in throat, but \"it's getting better.\" After provided time to recover, this clinician provided education regarding small sips and one sip at a time, with multiple swallows per bolus. Pt stated understanding and demo'd appropriate implementation of strategies with no cough response. Per MBSS completed 10/23, throat clear was effective in clearing penetration.       Clinical Impressions  Patient presentation consistent with mild-moderate oropharyngeal dysphagia per recent MBSS. Recommend continuation of oral diet of soft&bite sized solids and thin liquids with 1:1 supervision and strict adherence to strategies listed below. 1:1 supervision recommended to assist pt with implementation of strategies. Will consider a repeat diagnostic swallow study with any concerns or changes in presentation.        Recommendations  Treatment " "Completed: Dysphagia Treatment  Consult Referral(s): Gastroenterologist    Dysphagia Treatment  Diet Consistency: Soft and bite sized solids and thin liquids  Instrumentation: Instrumental swallow study pending clinical progress  Medication: Crush with applesauce, as appropriate, Crush with pudding/puree, as appropriate  Supervision: 1:1 feeding with constant supervision  Positioning: Fully upright and midline during oral intake, Remain upright for 30-45 minutes after oral intake, Meals sitting upright in a chair, as tolerated  Risk Management : Small bites/sips, Alternate bites and sips, Slow rate of intake (Swallow x2 per bite)  Oral Care: Q4h        SLP Treatment Plan  Treatment Plan: Dysphagia Treatment  SLP Frequency: 3x Per Week  Estimated Duration: Until Therapy Goals Met      Anticipated Discharge Needs  Discharge Recommendations: Recommend post-acute placement for additional speech therapy services prior to discharge home  Therapy Recommendations Upon DC: Dysphagia Training, Patient / Family / Caregiver Education      Patient / Family Goals  Patient / Family Goal #1: \"My mouth is so dry\"  Goal #1 Outcome: Progressing as expected  Short Term Goals  Short Term Goal # 1: Pt will consume a diet of SB6/TN0 without any overt s/sx of aspiration or decline in respiratory status  Goal Outcome # 1: Progressing as expected  Short Term Goal # 2: Per results of recent MBSS, Pt will complete exercises targeting BOT retraction, pharyngeal shortening, and LVC x30 in a session.      Emanuel Jimenez, JOANNE  "

## 2023-11-09 NOTE — CARE PLAN
The patient is Stable - Low risk of patient condition declining or worsening    Shift Goals  Clinical Goals: pain management, stable vital signs,  Patient Goals: go back home  Family Goals: JUJU, not present at this time    Progress made toward(s) clinical / shift goals:    Problem: Skin Integrity  Goal: Skin integrity is maintained or improved  11/9/2023 0658 by Cara Holm, R.N.  Outcome: Progressing  11/9/2023 0657 by Cara Holm R.N.  Outcome: Progressing     Problem: Fall Risk  Goal: Patient will remain free from falls  11/9/2023 0658 by Cara Holm R.N.  Outcome: Progressing  11/9/2023 0657 by Cara Holm, R.N.  Outcome: Progressing     Problem: Pain - Standard  Goal: Alleviation of pain or a reduction in pain to the patient’s comfort goal  11/9/2023 0658 by Cara Holm, R.N.  Outcome: Progressing  11/9/2023 0657 by Cara Holm, R.N.  Outcome: Progressing       Patient is not progressing towards the following goals:      Problem: Mobility  Goal: Patient's capacity to carry out activities will improve  Outcome: Not Progressing

## 2023-11-09 NOTE — DISCHARGE SUMMARY
Discharge Summary    CHIEF COMPLAINT ON ADMISSION  Chief Complaint   Patient presents with    Hip Pain     BIB EMS from [place] for post op hip pain. Patient had femur fracture repair on 10/22 and arrives today with an increase in pain.        Reason for Admission  EMS    Admission Date  11/6/2023     CODE STATUS  DNAR/DNI    HPI & HOSPITAL COURSE  This is a 89 y.o. female here with right hip pain      Kathleen Mendieta is a 89 y.o. female who presented 11/6/2023 with hip pain, postop complication.  Very pleasant woman with history of hypothyroidism, Alzheimer's dementia, recent right intertrochanteric hip fracture on 10/21/2023 after a fall in the bathroom.  She was taken for right femur cephalomedullary nail on 10/22.  Postop course complicated by anemia requiring 1 unit PRBC transfusion, hemoglobin stable since then.  She was discharged to SNF for PT/OT.  She was brought into [facility] after she was noted to have acute worsening of hip pain.  Imaging showed 90 degree angulation of right intertrochanteric femoral neck with penetration of dynamic hip screw through the femoral head penetrating into the superior acetabulum, as well as stable left inferior pubic ramus fracture.  Orthopedics consulted, patient taken for conversion right proximal femur to hip arthroplasty on 11/7.  Postoperatively patient had some hypotension, anemia and went into atrial fibrillation with RVR.  She was started on low-dose metoprolol, IV fluids, and given a blood transfusion with return of sinus rhythm.  Discussion was had with patient's son at bedside about anticoagulation and stroke prophylaxis, since she has only had 1 episode and never had a before they would like to hold off on starting blood thinner at this time, I suspect this was just reactionary to her surgery and anemia.  Patient had some peripheral edema after blood transfusion, she should continue the low-dose oral Lasix for the next 3 days and then have  repeat lab work.  I doubt she will need to continue this thereafter if she is not on this normally.  Patient did have some sanguinous discharge through her bandage which was redressed by orthopedic surgery and they cleared her for discharge to SNF.    Therefore, she is discharged in fair and stable condition to skilled nursing facility.    The patient met 2-midnight criteria for an inpatient stay at the time of discharge.      FOLLOW UP ITEMS POST DISCHARGE  Follow up with orthopedic surgery in 10-14 days.  Follow up with primary care as needed   Recommend repeat CBC, BMP in 3 days     DISCHARGE DIAGNOSES  Principal Problem:    Closed right hip fracture, sequela (POA: Yes)  Active Problems:    ANETTE (acute kidney injury) (HCC) (POA: Yes)    Hypothyroidism (POA: Yes)    Alzheimer disease (HCC) (POA: Yes)    Acute respiratory failure with hypoxia (HCC) (POA: Yes)    Acute blood loss anemia (ABLA) (POA: Yes)    Elevated hemidiaphragm (POA: Yes)  Resolved Problems:    * No resolved hospital problems. *      FOLLOW UP  No future appointments.  Jc Escobar M.D.  [address] [facility] [id number] [phone]    Follow up  For suture removal  Follow-up with orthopedic surgery in 10 to 14 days      MEDICATIONS ON DISCHARGE     Medication List        START taking these medications        Instructions   furosemide 20 MG Tabs  Start taking on: November 10, 2023  Commonly known as: Lasix   Take 1 Tablet by mouth every day for 3 days.  Dose: 20 mg            CHANGE how you take these medications        Instructions   HYDROcodone-acetaminophen 5-325 MG Tabs per tablet  What changed: reasons to take this  Commonly known as: Norco   Take 1 Tablet by mouth every 6 hours as needed (moderate to severe pain) for up to 3 days. Indications: Pain  Dose: 1 Tablet            CONTINUE taking these medications        Instructions   Acetaminophen 8 Hour 650 MG CR tablet  Generic drug: acetaminophen   Take 650 mg by mouth every 6  hours as needed.  Dose: 650 mg     ascorbic acid 500 MG Tabs  Commonly known as: Ascorbic Acid   Take 500 mg by mouth 2 times a day.  Dose: 500 mg     aspirin 81 MG EC tablet   Take 81 mg by mouth every day.  Dose: 81 mg     citalopram 10 MG tablet  Commonly known as: CeleXA   Take 10 mg by mouth at bedtime.  Dose: 10 mg     docusate sodium 100 MG Caps  Commonly known as: Colace   Take 100 mg by mouth 1 time a day as needed for Constipation.  Dose: 100 mg     donepezil 5 MG Tabs  Commonly known as: Aricept   Take 5 mg by mouth every evening.  Dose: 5 mg     guaiFENesin  MG Tb12  Commonly known as: Mucinex   Take 1,200 mg by mouth every 12 hours.  Dose: 1,200 mg     ipratropium-albuterol 0.5-2.5 (3) MG/3ML nebulizer solution  Commonly known as: Duoneb   Take 3 mL by nebulization every 6 hours as needed for Shortness of Breath.  Dose: 3 mL     levothyroxine 88 MCG Tabs  Commonly known as: Synthroid   Take 88 mcg by mouth every morning on an empty stomach.  Dose: 88 mcg     lidocaine 5 % Ptch  Commonly known as: Lidoderm   Place 1 Patch on the skin every 24 hours. *mid back*  Dose: 1 Patch              Allergies  No Known Allergies    DIET  Orders Placed This Encounter   Procedures    Diet Order Diet: Level 6 - Soft and Bite Sized; Liquid level: Level 0 - Thin; Second Modifier: (optional): Cardiac; Tray Modifications (optional): SLP - 1:1 Supervision by Nursing     Standing Status:   Standing     Number of Occurrences:   1     Order Specific Question:   Diet:     Answer:   Level 6 - Soft and Bite Sized [23]     Order Specific Question:   Liquid level     Answer:   Level 0 - Thin     Order Specific Question:   Second Modifier: (optional)     Answer:   Cardiac [6]     Order Specific Question:   Tray Modifications (optional)     Answer:   SLP - 1:1 Supervision by Nursing       ACTIVITY  As tolerated.  Weight bearing as tolerated    LINES, DRAINS, AND WOUNDS  This is an automated list. Peripheral IVs will be removed  prior to discharge.  Peripheral IV 11/06/23 20 G Left Antecubital (Active)   Site Assessment Clean;Dry;Intact 11/08/23 2000   Dressing Type Transparent 11/08/23 2000   Line Status Scrubbed the hub prior to access;Flushed;Saline locked;No blood return 11/08/23 2000   Dressing Status Clean;Dry;Intact 11/08/23 2000   Dressing Intervention N/A 11/08/23 2000   Infiltration Grading ([facility], [facility]) 0 11/08/23 2000   Phlebitis Scale ([facility] Only) 0 11/08/23 2000       Peripheral IV 11/07/23 (Active)   Site Assessment Clean;Dry;Intact 11/08/23 2000   Dressing Type Transparent 11/08/23 2000   Line Status Scrubbed the hub prior to access;Flushed;Saline locked;No blood return 11/08/23 2000   Dressing Status Clean;Dry;Intact 11/08/23 2000   Dressing Intervention N/A 11/08/23 2000   Date Primary Tubing Changed 11/07/23 11/07/23 2345   Infiltration Grading ([facility], [facility]) 0 11/08/23 2000   Phlebitis Scale ([facility] Only) 0 11/08/23 2000       Wound 10/22/23 Incision Hip Right xeroform, 4x4, tegaderm (Active)   Site Assessment Hypergranulation;JUJU 11/08/23 2000   Periwound Assessment Fragile;JUJU 11/08/23 2000   Drainage Amount Large 11/08/23 2000   Drainage Description Serosanguineous 11/08/23 2000   Dressing Status New drainage 11/08/23 2000   Dressing Changed Observed 11/08/23 2000   Dressing Options Petrolatum Gauze (yellow);Dry Gauze;Transparent Film 10/23/23 2024       Peripheral IV 11/06/23 20 G Left Antecubital (Active)   Site Assessment Clean;Dry;Intact 11/08/23 2000   Dressing Type Transparent 11/08/23 2000   Line Status Scrubbed the hub prior to access;Flushed;Saline locked;No blood return 11/08/23 2000   Dressing Status Clean;Dry;Intact 11/08/23 2000   Dressing Intervention N/A 11/08/23 2000   Infiltration Grading ([facility], [facility]) 0 11/08/23 2000   Phlebitis Scale ([facility] Only) 0 11/08/23 2000       Peripheral IV 11/07/23 (Active)   Site Assessment Clean;Dry;Intact 11/08/23 2000   Dressing Type Transparent 11/08/23 2000   Line  Status Scrubbed the hub prior to access;Flushed;Saline locked;No blood return 11/08/23 2000   Dressing Status Clean;Dry;Intact 11/08/23 2000   Dressing Intervention N/A 11/08/23 2000   Date Primary Tubing Changed 11/07/23 11/07/23 2345   Infiltration Grading ([facility], [facility]) 0 11/08/23 2000   Phlebitis Scale ([facility] Only) 0 11/08/23 2000               MENTAL STATUS ON TRANSFER             CONSULTATIONS  Orthopedic surgery    PROCEDURES  11/9/2023  PROCEDURE PERFORMED: Conversion right proximal femur to hip arthroplasty     LABORATORY  Lab Results   Component Value Date    SODIUM 134 (L) 11/09/2023    POTASSIUM 4.8 11/09/2023    CHLORIDE 100 11/09/2023    CO2 24 11/09/2023    GLUCOSE 98 11/09/2023    BUN 31 (H) 11/09/2023    CREATININE 1.55 (H) 11/09/2023        Lab Results   Component Value Date    WBC 9.5 11/09/2023    HEMOGLOBIN 8.0 (L) 11/09/2023    HEMATOCRIT 24.6 (L) 11/09/2023    PLATELETCT 288 11/09/2023        Total time of the discharge process exceeds 34 minutes.

## 2023-11-09 NOTE — CARE PLAN
The patient is Stable - Low risk of patient condition declining or worsening    Shift Goals  Clinical Goals: DC POC  Patient Goals: DC  Family Goals: DC    Progress made toward(s) clinical / shift goals:        Problem: Knowledge Deficit - Standard  Goal: Patient and family/care givers will demonstrate understanding of plan of care, disease process/condition, diagnostic tests and medications  Outcome: Met     Problem: Skin Integrity  Goal: Skin integrity is maintained or improved  Outcome: Met     Problem: Fall Risk  Goal: Patient will remain free from falls  Outcome: Met     Problem: Pain - Standard  Goal: Alleviation of pain or a reduction in pain to the patient’s comfort goal  Outcome: Met     Problem: Mobility  Goal: Patient's capacity to carry out activities will improve  Outcome: Met       Patient is not progressing towards the following goals:

## 2023-11-09 NOTE — PROGRESS NOTES
"      Orthopaedic Progress Note    Interval changes:  Patient doing well    Right hip dressings with serous saturation-dressing changed incision without issues  Cleared for DC to SNF by ortho pending medicine clearance    ROS - Patient denies any new issues.  Pain well controlled.    /46   Pulse 83   Temp 36.9 °C (98.4 °F) (Temporal)   Resp 18   Ht 1.549 m (5' 1\")   Wt 66.5 kg (146 lb 9.7 oz)   SpO2 100%     Patient seen and examined  No acute distress  Breathing non labored  RRR  Right hip dressings with serous saturation-dressing changed incision without issues, DNVI, moves all toes, cap refill <2 sec.      Recent Labs     11/06/23  2114 11/07/23  1455 11/08/23  2349 11/09/23  0337 11/09/23  0601   WBC 8.1  --   --  9.5  --    RBC 2.33*  --   --  2.64*  --    HEMOGLOBIN 7.8*   < > 8.1* 8.0* 8.0*   HEMATOCRIT 25.1*   < > 24.9* 24.9* 24.6*   .7*  --   --  94.3  --    MCH 33.5*  --   --  30.3  --    MCHC 31.1*  --   --  32.1*  --    RDW 73.4*  --   --  63.7*  --    PLATELETCT 440  --   --  288  --    MPV 9.2  --   --  9.8  --     < > = values in this interval not displayed.       Active Hospital Problems    Diagnosis     Elevated hemidiaphragm [J98.6]     Closed right hip fracture, sequela [S72.001S]     Acute blood loss anemia (ABLA) [D62]     Acute respiratory failure with hypoxia (HCC) [J96.01]     Alzheimer disease (HCC) [G30.9, F02.80]     Hypothyroidism [E03.9]     ANETTE (acute kidney injury) ([facility]) [N17.9]        Assessment/Plan:  Patient doing well    Right hip dressings with serous saturation-dressing changed incision without issues  Cleared for DC to SNF by ortho pending medicine clearance  POD#2 S/P Right femur cephalomedullary nail  Wt bearing status - WBAT  Wound care/Drains - Dressings to be changed every other day by nursing. Or PRN for saturation starting POD#2  Future Procedures - none planned   Sutures/Staples out- 14-21 days post operatively. Removal will completed by ortho mid " levels only.  PT/OT-initiated  Antibiotics: Perioperative completed  DVT Prophylaxis- TEDS/SCDs/Foot pumps  Britton-not needed per ortho  Case Coordination for Discharge Planning - Disposition per therapy recs.

## 2023-11-09 NOTE — ASSESSMENT & PLAN NOTE
Likely has baseline CKD, mild ANETTE today likely from hypotension and blood loss from surgery  Given IV fluids overnight, pending 1 unit PRBC transfusion today  Repeat BMP in a.m.

## 2023-11-09 NOTE — DISCHARGE PLANNING
Agency/Facility Name: [facility]  Outcome: DPA left v-mail requesting update on bed availability, informed SNF that Pt is ready to DC today.    RN CM notified.     0920-  Agency/Facility Name: [facility]  Outcome: SNF Intake Liaison confirmed they have a bed available for Pt today, BILLY informed SNF that Pt is scheduled for transport at 1300 today.     RN CM notified.

## 2023-11-09 NOTE — PROGRESS NOTES
Received report from night shift RN. Assumed care of patient at 0700. Patient with soiled dressing, VSS, no pain at this this time. On 2L nc O2, no signs of respiratory distress. No signs/sympotoms of pain or discomfort. Call light and belongings within reach. Bed in lowest locked position. Upper side rails raised. Hourly rounding in place. Will continue to monitor. Britton Catheter still in place. Adductor pillow in place. Heel mepilex in place

## 2023-11-09 NOTE — PROGRESS NOTES
NOC HOSPITALIST CROSS COVER    Notified by RN regarding concern for ongoing bleeding.  Per nursing report, the patient's surgical dressing was saturated with serosanguineous drainage.  She had received a unit of blood during dayshift for hemoglobin of 6.8.  A stat H&H was ordered which resulted at 8.1/24.9.  Chest x-ray was also obtained for concerns of fluid overload which demonstrated stable pulmonary edema.      Vitals:    11/09/23 0359   BP: 102/46   Pulse: 84   Resp: 18   Temp:    SpO2: 94%        -----------------------------------------------------------------------------------------------------------    Electronically signed by:  Juan Carlos Velez, NESTOR, APRN, MONIQUEP-BC  Hospitalist Services

## (undated) DEVICE — TOWEL STOP TIMEOUT SAFETY FLAG (40EA/CA)

## (undated) DEVICE — SUCTION INSTRUMENT YANKAUER OPEN TIP W/O VENT (50EA/CA)

## (undated) DEVICE — PACK TOTAL HIP - (1/CA)

## (undated) DEVICE — SUTURE 2-0 VICRYL PLUS CT-1 - 8 X 18 INCH(12/BX)

## (undated) DEVICE — CONNECTOR 5-IN-1 STERILE - (25EA/BX)

## (undated) DEVICE — LACTATED RINGERS INJ 1000 ML - (14EA/CA 60CA/PF)

## (undated) DEVICE — SODIUM CHL IRRIGATION 0.9% 1000ML (12EA/CA)

## (undated) DEVICE — STAPLER SKIN DISP - (6/BX 10BX/CA) VISISTAT

## (undated) DEVICE — DRAPE STRLE REG TOWEL 18X24 - (10/BX 4BX/CA)"

## (undated) DEVICE — SUCTION INSTRUMENT YANKAUER BULBOUS TIP W/O VENT (50EA/CA)

## (undated) DEVICE — SENSOR OXIMETER ADULT SPO2 RD SET (20EA/BX)

## (undated) DEVICE — CANISTER SUCTION 3000ML MECHANICAL FILTER AUTO SHUTOFF MEDI-VAC NONSTERILE LF DISP (40EA/CA)

## (undated) DEVICE — GLOVE BIOGEL SZ 7.5 SURGICAL PF LTX - (50PR/BX 4BX/CA)

## (undated) DEVICE — SUTURE 5 ETHIBOND V-37 (12PK/BX)

## (undated) DEVICE — DRESSING POST OP BORDER 4 X 10 (5EA/BX)

## (undated) DEVICE — GLOVE BIOGEL INDICATOR SZ 8 SURGICAL PF LTX - (50/BX 4BX/CA)

## (undated) DEVICE — SET EXTENSION WITH 2 PORTS (48EA/CA) ***PART #2C8610 IS A SUBSTITUTE*****

## (undated) DEVICE — BOVIE BLADE COATED - (50/PK)

## (undated) DEVICE — TOWELS CLOTH SURGICAL - (4/PK 20PK/CA)

## (undated) DEVICE — DRESSING TRANSPARENT FILM TEGADERM 4 X 4.75" (50EA/BX)"

## (undated) DEVICE — SUTURE 1 VICRYL PLUS CTX - 8 X 18 INCH (12/BX)

## (undated) DEVICE — DRAPE 36X28IN RAD CARM BND BG - (25/CA) O

## (undated) DEVICE — DRAPE C-ARM LARGE 41IN X 74 IN - (10/BX 2BX/CA)

## (undated) DEVICE — GOWN WARMING STANDARD FLEX - (30/CA)

## (undated) DEVICE — TUBING CLEARLINK DUO-VENT - C-FLO (48EA/CA)

## (undated) DEVICE — SLEEVE VASO DVT COMPRESSION CALF MED - (10PR/CA)

## (undated) DEVICE — Device

## (undated) DEVICE — CHLORAPREP 26 ML APPLICATOR - ORANGE TINT(25/CA)

## (undated) DEVICE — PACK LOWER EXTREMITY - (2/CA)

## (undated) DEVICE — PACK MAJOR ORTHO - (2EA/CA)

## (undated) DEVICE — BLADE SAGITTAL SAW DUAL CUT 75.0 X 25.0MM (1/EA)

## (undated) DEVICE — DRAPE LARGE 3 QUARTER - (20/CA)

## (undated) DEVICE — SET LEADWIRE 5 LEAD BEDSIDE DISPOSABLE ECG (1SET OF 5/EA)

## (undated) DEVICE — SLEEVE, VASO, THIGH, MED

## (undated) DEVICE — COVER LIGHT HANDLE ALC PLUS DISP (18EA/BX)

## (undated) DEVICE — SUTURE 1 STRATAFIX SYMMETRIC PDS CTX 45CM (12EA/BX)

## (undated) DEVICE — BAG SPONGE COUNT 10.25 X 32 - BLUE (250/CA)

## (undated) DEVICE — PENCIL ELECTSURG 10FT BTN SWH - (50/CA)

## (undated) DEVICE — ELECTRODE DUAL RETURN W/ CORD - (50/PK)

## (undated) DEVICE — SUTURE GENERAL

## (undated) DEVICE — SUTURE 5 TI-CRON HOS-14 - (36/BX)